# Patient Record
Sex: MALE | Race: WHITE | Employment: OTHER | URBAN - METROPOLITAN AREA
[De-identification: names, ages, dates, MRNs, and addresses within clinical notes are randomized per-mention and may not be internally consistent; named-entity substitution may affect disease eponyms.]

---

## 2019-05-01 LAB
AVERAGE GLUCOSE: 91
AVERAGE GLUCOSE: 91
CHOLESTEROL, TOTAL: 103 MG/DL
CHOLESTEROL/HDL RATIO: 3
HBA1C MFR BLD: 4.8 %
HBA1C MFR BLD: 4.8 %
HDLC SERPL-MCNC: 34 MG/DL (ref 35–70)
LDL CHOLESTEROL CALCULATED: 54 MG/DL (ref 0–160)
NONHDLC SERPL-MCNC: ABNORMAL MG/DL
TESTOSTERONE FREE: 42.5
TESTOSTERONE TOTAL: 253
TRIGL SERPL-MCNC: 75 MG/DL
TSH SERPL DL<=0.05 MIU/L-ACNC: 0.69 UIU/ML
VLDLC SERPL CALC-MCNC: ABNORMAL MG/DL

## 2019-12-06 ENCOUNTER — HOSPITAL ENCOUNTER (OUTPATIENT)
Age: 69
Discharge: HOME | End: 2019-12-08
Payer: MEDICARE

## 2019-12-06 ENCOUNTER — HOSPITAL ENCOUNTER (OUTPATIENT)
Dept: GENERAL RADIOLOGY | Age: 69
Discharge: HOME | End: 2019-12-08
Payer: MEDICARE

## 2019-12-06 ENCOUNTER — HOSPITAL ENCOUNTER (OUTPATIENT)
Dept: NON INVASIVE DIAGNOSTICS | Age: 69
Discharge: HOME | End: 2019-12-06
Payer: MEDICARE

## 2019-12-06 DIAGNOSIS — R06.02 SOB (SHORTNESS OF BREATH): ICD-10-CM

## 2019-12-06 PROCEDURE — 71046 X-RAY EXAM CHEST 2 VIEWS: CPT

## 2019-12-06 PROCEDURE — 93350 STRESS TTE ONLY: CPT

## 2019-12-06 RX ORDER — LANOLIN ALCOHOL/MO/W.PET/CERES
500 CREAM (GRAM) TOPICAL NIGHTLY
COMMUNITY
End: 2020-09-03

## 2019-12-06 RX ORDER — ESCITALOPRAM OXALATE 10 MG/1
10 TABLET ORAL DAILY
COMMUNITY
End: 2021-03-30 | Stop reason: SDUPTHER

## 2019-12-06 RX ORDER — SIMVASTATIN 20 MG
10 TABLET ORAL NIGHTLY
COMMUNITY
End: 2021-04-05 | Stop reason: SDUPTHER

## 2020-03-19 LAB
ALBUMIN SERPL-MCNC: NORMAL G/DL
ALP BLD-CCNC: NORMAL U/L
ALT SERPL-CCNC: NORMAL U/L
ANION GAP SERPL CALCULATED.3IONS-SCNC: NORMAL MMOL/L
AST SERPL-CCNC: NORMAL U/L
BASOPHILS ABSOLUTE: NORMAL
BASOPHILS RELATIVE PERCENT: NORMAL
BILIRUB SERPL-MCNC: NORMAL MG/DL
BUN BLDV-MCNC: NORMAL MG/DL
CALCIUM SERPL-MCNC: NORMAL MG/DL
CHLORIDE BLD-SCNC: NORMAL MMOL/L
CHOLESTEROL, TOTAL: 106 MG/DL
CHOLESTEROL/HDL RATIO: 3.5
CO2: NORMAL
CREAT SERPL-MCNC: NORMAL MG/DL
EOSINOPHILS ABSOLUTE: NORMAL
EOSINOPHILS RELATIVE PERCENT: NORMAL
GFR CALCULATED: NORMAL
GLUCOSE BLD-MCNC: NORMAL MG/DL
HCT VFR BLD CALC: NORMAL %
HDLC SERPL-MCNC: 30 MG/DL (ref 35–70)
HEMOGLOBIN: NORMAL
LDL CHOLESTEROL CALCULATED: 57 MG/DL (ref 0–160)
LYMPHOCYTES ABSOLUTE: NORMAL
LYMPHOCYTES RELATIVE PERCENT: NORMAL
MCH RBC QN AUTO: NORMAL PG
MCHC RBC AUTO-ENTMCNC: NORMAL G/DL
MCV RBC AUTO: NORMAL FL
MONOCYTES ABSOLUTE: NORMAL
MONOCYTES RELATIVE PERCENT: NORMAL
NEUTROPHILS ABSOLUTE: NORMAL
NEUTROPHILS RELATIVE PERCENT: NORMAL
NONHDLC SERPL-MCNC: ABNORMAL MG/DL
PDW BLD-RTO: NORMAL %
PLATELET # BLD: NORMAL 10*3/UL
PMV BLD AUTO: NORMAL FL
POTASSIUM SERPL-SCNC: NORMAL MMOL/L
RBC # BLD: NORMAL 10*6/UL
SODIUM BLD-SCNC: NORMAL MMOL/L
TOTAL PROTEIN: NORMAL
TRIGL SERPL-MCNC: 93 MG/DL
VITAMIN D 25-HYDROXY: 37.9
VITAMIN D2, 25 HYDROXY: NORMAL
VITAMIN D3,25 HYDROXY: NORMAL
VLDLC SERPL CALC-MCNC: 19 MG/DL
WBC # BLD: NORMAL 10*3/UL

## 2020-06-19 LAB
CHOLESTEROL, TOTAL: 111 MG/DL
CHOLESTEROL/HDL RATIO: 3
HDLC SERPL-MCNC: 33 MG/DL (ref 35–70)
LDL CHOLESTEROL CALCULATED: 58 MG/DL (ref 0–160)
NONHDLC SERPL-MCNC: ABNORMAL MG/DL
TRIGL SERPL-MCNC: 99 MG/DL
VLDLC SERPL CALC-MCNC: ABNORMAL MG/DL

## 2020-09-03 VITALS
SYSTOLIC BLOOD PRESSURE: 126 MMHG | HEART RATE: 72 BPM | DIASTOLIC BLOOD PRESSURE: 80 MMHG | TEMPERATURE: 96.6 F | RESPIRATION RATE: 14 BRPM | WEIGHT: 227 LBS

## 2020-09-03 RX ORDER — POLYETHYLENE GLYCOL 3350 17 G/17G
17 POWDER, FOR SOLUTION ORAL DAILY
COMMUNITY
End: 2021-02-17

## 2020-09-03 RX ORDER — DOCUSATE SODIUM 100 MG/1
100 CAPSULE, LIQUID FILLED ORAL 2 TIMES DAILY PRN
COMMUNITY
End: 2021-03-10 | Stop reason: ALTCHOICE

## 2020-09-03 RX ORDER — NIACIN 500 MG
500 TABLET ORAL DAILY
COMMUNITY
End: 2021-02-03 | Stop reason: SDUPTHER

## 2020-09-03 RX ORDER — CHOLECALCIFEROL (VITAMIN D3) 1250 MCG
CAPSULE ORAL
COMMUNITY
End: 2021-02-17

## 2020-09-03 RX ORDER — OMEPRAZOLE 20 MG/1
20 CAPSULE, DELAYED RELEASE ORAL DAILY
COMMUNITY
End: 2021-03-10 | Stop reason: ALTCHOICE

## 2020-09-21 VITALS
RESPIRATION RATE: 17 BRPM | TEMPERATURE: 96 F | DIASTOLIC BLOOD PRESSURE: 80 MMHG | HEART RATE: 68 BPM | SYSTOLIC BLOOD PRESSURE: 118 MMHG | WEIGHT: 228 LBS

## 2020-12-23 ENCOUNTER — OFFICE VISIT (OUTPATIENT)
Dept: FAMILY MEDICINE CLINIC | Age: 70
End: 2020-12-23
Payer: MEDICARE

## 2020-12-23 VITALS
HEART RATE: 105 BPM | RESPIRATION RATE: 16 BRPM | DIASTOLIC BLOOD PRESSURE: 88 MMHG | BODY MASS INDEX: 35.55 KG/M2 | TEMPERATURE: 96.8 F | SYSTOLIC BLOOD PRESSURE: 132 MMHG | WEIGHT: 234.6 LBS | OXYGEN SATURATION: 97 % | HEIGHT: 68 IN

## 2020-12-23 LAB
BILIRUBIN, POC: NORMAL
BLOOD URINE, POC: NORMAL
CLARITY, POC: NORMAL
COLOR, POC: NORMAL
GLUCOSE URINE, POC: NORMAL
KETONES, POC: NORMAL
LEUKOCYTE EST, POC: NORMAL
NITRITE, POC: NORMAL
PH, POC: 5.5
PROTEIN, POC: NORMAL
SPECIFIC GRAVITY, POC: 1.02
UROBILINOGEN, POC: NORMAL

## 2020-12-23 PROCEDURE — 93000 ELECTROCARDIOGRAM COMPLETE: CPT | Performed by: INTERNAL MEDICINE

## 2020-12-23 PROCEDURE — 81002 URINALYSIS NONAUTO W/O SCOPE: CPT | Performed by: INTERNAL MEDICINE

## 2020-12-23 PROCEDURE — G0438 PPPS, INITIAL VISIT: HCPCS | Performed by: INTERNAL MEDICINE

## 2020-12-23 PROCEDURE — G8484 FLU IMMUNIZE NO ADMIN: HCPCS | Performed by: INTERNAL MEDICINE

## 2020-12-23 RX ORDER — ASPIRIN 81 MG/1
81 TABLET ORAL DAILY
COMMUNITY

## 2020-12-23 ASSESSMENT — PATIENT HEALTH QUESTIONNAIRE - PHQ9
SUM OF ALL RESPONSES TO PHQ QUESTIONS 1-9: 0
SUM OF ALL RESPONSES TO PHQ QUESTIONS 1-9: 0
2. FEELING DOWN, DEPRESSED OR HOPELESS: 0
SUM OF ALL RESPONSES TO PHQ9 QUESTIONS 1 & 2: 0
1. LITTLE INTEREST OR PLEASURE IN DOING THINGS: 0
SUM OF ALL RESPONSES TO PHQ QUESTIONS 1-9: 0

## 2020-12-23 NOTE — PROGRESS NOTES
Subjective:     Chief Complaint   Patient presents with    Annual Exam   Patient in for follow-up on the lab report, annual exam, check on the cholesterol  GE reflux is doing much better    Past Medical History:   Diagnosis Date    Depression     Erectile dysfunction     GERD (gastroesophageal reflux disease)     Glaucoma     Headache     Hemorrhoids     History of cardiovascular stress test 2019    Stress ECHO    Hyperlipidemia     Hypogonadism male     Seizures ([facility] [address]     Sleep apnea         Social History     Socioeconomic History    Marital status:      Spouse name: Not on file    Number of children: Not on file    Years of education: Not on file    Highest education level: Not on file   Occupational History    Not on file   Social Needs    Financial resource strain: Not on file    Food insecurity     Worry: Not on file     Inability: Not on file    Transportation needs     Medical: Not on file     Non-medical: Not on file   Tobacco Use    Smoking status: Former Smoker     Packs/day: 1.00     Years: 18.00     Pack years: 18.00     Types: Cigarettes     Quit date: 9/3/1986     Years since quittin.3    Smokeless tobacco: Former User   Substance and Sexual Activity    Alcohol use: Yes     Comment: socially    Drug use: Not on file    Sexual activity: Yes     Partners: Female   Lifestyle    Physical activity     Days per week: Not on file     Minutes per session: Not on file    Stress: Not on file   Relationships    Social connections     Talks on phone: Not on file     Gets together: Not on file     Attends Orthodox service: Not on file     Active member of club or organization: Not on file     Attends meetings of clubs or organizations: Not on file     Relationship status: Not on file    Intimate partner violence     Fear of current or ex partner: Not on file     Emotionally abused: Not on file     Physically abused: Not on file     Forced sexual activity: Not on file 20 MG delayed release capsule Take 20 mg by mouth daily      polyethylene glycol (GLYCOLAX) 17 GM/SCOOP powder Take 17 g by mouth daily      docusate sodium (COLACE) 100 MG capsule Take 100 mg by mouth 2 times daily as needed for Constipation       No current facility-administered medications for this visit.          Last 3 BMP  Lab Results   Component Value Date/Time     12/18/2020 08:30 AM    K 4.7 12/18/2020 08:30 AM     12/18/2020 08:30 AM    CO2 22 12/18/2020 08:30 AM    BUN 15 12/18/2020 08:30 AM    CREATININE 1.0 12/18/2020 08:30 AM    GLUCOSE 105 (H) 12/18/2020 08:30 AM    CALCIUM 9.8 12/18/2020 08:30 AM       Last 3 CMP:    Lab Results   Component Value Date/Time     12/18/2020 08:30 AM    K 4.7 12/18/2020 08:30 AM     12/18/2020 08:30 AM    CO2 22 12/18/2020 08:30 AM    BUN 15 12/18/2020 08:30 AM    CREATININE 1.0 12/18/2020 08:30 AM    GLUCOSE 105 (H) 12/18/2020 08:30 AM    CALCIUM 9.8 12/18/2020 08:30 AM    PROT 7.7 12/18/2020 08:30 AM    LABALBU 4.4 12/18/2020 08:30 AM    BILITOT 0.5 12/18/2020 08:30 AM    ALKPHOS 79 12/18/2020 08:30 AM    AST 34 12/18/2020 08:30 AM    ALT 28 12/18/2020 08:30 AM        CBC:   Lab Results   Component Value Date/Time    WBC 6.8 12/18/2020 08:30 AM    RBC 5.01 12/18/2020 08:30 AM    HGB 14.7 12/18/2020 08:30 AM    HCT 44.0 12/18/2020 08:30 AM    MCV 87.8 12/18/2020 08:30 AM    MCH 29.3 12/18/2020 08:30 AM    MCHC 33.4 12/18/2020 08:30 AM    RDW 13.1 12/18/2020 08:30 AM     12/18/2020 08:30 AM    MPV 9.6 12/18/2020 08:30 AM       A1C:  Lab Results   Component Value Date/Time    LABA1C 4.8 05/01/2019    LABA1C 4.8 05/01/2019       Lipid panel:  Lab Results   Component Value Date    CHOL 123 12/18/2020    CHOL 111 06/19/2020    CHOL 106 03/19/2020    TRIG 105 12/18/2020    TRIG 99 06/19/2020    TRIG 93 03/19/2020    HDL 33 12/18/2020    HDL 33 06/19/2020    HDL 30 03/19/2020        Lab Results   Component Value Date/Time    PROT 7.7 12/18/2020 08:30 AM       No results found for: MG      Assessment. Nirav Cunningham was seen today for annual exam.    Diagnoses and all orders for this visit:    Annual physical exam  -     POCT Urinalysis no Micro  -     EKG 12 Lead; Future  -     EKG 12 Lead    Hyperlipidemia, unspecified hyperlipidemia type    Depression, unspecified depression type    Gastroesophageal reflux disease without esophagitis    Obstructive sleep apnea syndrome    Other orders  -     Cancel: INFLUENZA, QUADV, ADJUVANTED, 65 YRS =, IM, PF, PREFILL SYR, 0.5ML (FLUAD)       There is no problem list on file for this patient. Plan: Lab report reviewed  Cholesterol doing well  Blood sugar slightly elevated he is eating a lot of carbohydrates and has gained weight    Low-carb diet discussed regular exercise discussed    She had a stress test in 2019 with Dr. Ruth Villa which was okay as per patient, chest x-ray December 2019 -    He had a colonoscopy December 2019 by Dr. Jennifer Orellana which was negative    Continue current cluster medication Lexapro      Patient was counseled        Return in about 3 months (around 3/23/2021).        Myriam Mejia MD  4:05 PM  12/23/2020     [place]

## 2021-01-01 ENCOUNTER — HOSPITAL ENCOUNTER (EMERGENCY)
Age: 71
Discharge: HOME | End: 2021-01-01
Payer: MEDICARE

## 2021-01-01 ENCOUNTER — APPOINTMENT (OUTPATIENT)
Dept: GENERAL RADIOLOGY | Age: 71
End: 2021-01-01
Payer: MEDICARE

## 2021-01-01 VITALS
HEART RATE: 69 BPM | RESPIRATION RATE: 20 BRPM | SYSTOLIC BLOOD PRESSURE: 139 MMHG | WEIGHT: 234 LBS | TEMPERATURE: 97.8 F | DIASTOLIC BLOOD PRESSURE: 87 MMHG | OXYGEN SATURATION: 97 % | HEIGHT: 68 IN | BODY MASS INDEX: 35.46 KG/M2

## 2021-01-01 DIAGNOSIS — M79.645 THUMB PAIN, LEFT: Primary | ICD-10-CM

## 2021-01-01 PROCEDURE — 99212 OFFICE O/P EST SF 10 MIN: CPT

## 2021-01-01 PROCEDURE — 73130 X-RAY EXAM OF HAND: CPT

## 2021-01-01 PROCEDURE — 6360000002 HC RX W HCPCS: Performed by: PHYSICIAN ASSISTANT

## 2021-01-01 PROCEDURE — 96372 THER/PROPH/DIAG INJ SC/IM: CPT

## 2021-01-01 RX ORDER — HYDROCODONE BITARTRATE AND ACETAMINOPHEN 5; 325 MG/1; MG/1
1 TABLET ORAL EVERY 6 HOURS PRN
Qty: 12 TABLET | Refills: 0 | Status: SHIPPED | OUTPATIENT
Start: 2021-01-01 | End: 2021-01-04

## 2021-01-01 RX ORDER — DEXAMETHASONE SODIUM PHOSPHATE 10 MG/ML
10 INJECTION, SOLUTION INTRAMUSCULAR; INTRAVENOUS ONCE
Status: COMPLETED | OUTPATIENT
Start: 2021-01-01 | End: 2021-01-01

## 2021-01-01 RX ADMIN — DEXAMETHASONE SODIUM PHOSPHATE 10 MG: 10 INJECTION, SOLUTION INTRAMUSCULAR; INTRAVENOUS at 12:01

## 2021-01-01 ASSESSMENT — PAIN DESCRIPTION - ORIENTATION: ORIENTATION: LEFT

## 2021-01-01 ASSESSMENT — PAIN SCALES - GENERAL: PAINLEVEL_OUTOF10: 9

## 2021-01-01 ASSESSMENT — PAIN DESCRIPTION - LOCATION: LOCATION: FINGER (COMMENT WHICH ONE)

## 2021-01-01 NOTE — ED PROVIDER NOTES
This is a 80-year-old male the presents to urgent care complaining of pain to the left hand more specifically at the base of the left thumb for the past day. He denies any injury but states the pain and swelling remind him of a problem he had with the right hand years ago and he had to have surgery to correct it. States he had some severe arthritic condition. He denies any history of gout. No numbness or tingling he has not take anything for the pain yet. On first contact patient he appears to be in no acute distress. Review of Systems   Constitutional:        Pertinent positives and negatives are stated within HPI, all other systems reviewed and are negative. Physical Exam  Vitals signs and nursing note reviewed. Constitutional:       Appearance: He is well-developed. HENT:      Head: Normocephalic and atraumatic. Jaw: No trismus. Right Ear: Hearing, tympanic membrane, ear canal and external ear normal.      Left Ear: Hearing, tympanic membrane, ear canal and external ear normal.      Nose: Nose normal.      Right Sinus: No maxillary sinus tenderness or frontal sinus tenderness. Left Sinus: No maxillary sinus tenderness or frontal sinus tenderness. Mouth/Throat:      Pharynx: Uvula midline. No uvula swelling. Eyes:      General: Lids are normal.      Conjunctiva/sclera: Conjunctivae normal.      Pupils: Pupils are equal, round, and reactive to light. Neck:      Musculoskeletal: Normal range of motion and neck supple. Cardiovascular:      Rate and Rhythm: Normal rate and regular rhythm. Heart sounds: Normal heart sounds. No murmur. Pulmonary:      Effort: Pulmonary effort is normal.      Breath sounds: Normal breath sounds. Abdominal:      General: Bowel sounds are normal.      Palpations: Abdomen is soft. Abdomen is not rigid. Tenderness: There is no abdominal tenderness. There is no guarding or rebound.    Musculoskeletal:      Left shoulder: Normal. Left elbow: Normal.      Left wrist: Normal.      Comments: He has tenderness to the left hand from the left first MTP joint area down the thenar eminence with some moderate swelling. I do not appreciate any erythema there is no open area no cyanosis. No red streaking. He deftly has pain with movement of thumb. Capillary risk is brisk. Skin:     General: Skin is warm and dry. Findings: No abrasion or rash. Neurological:      Mental Status: He is alert and oriented to person, place, and time. GCS: GCS eye subscore is 4. GCS verbal subscore is 5. GCS motor subscore is 6. Cranial Nerves: No cranial nerve deficit. Sensory: No sensory deficit. Coordination: Coordination normal.      Gait: Gait normal.         Procedures    MDM  Number of Diagnoses or Management Options  Thumb pain, left  Diagnosis management comments: X-ray was reviewed. Will place patient in a thumb spica type splint. Have him follow-up with his orthopedic surgeon I will give him a dose of Decadron here and place him on some pain medication for home. May also use topical medications as well and use an anti-inflammatory medication instructions given.           --------------------------------------------- PAST HISTORY ---------------------------------------------  Past Medical History:  has a past medical history of Depression, Erectile dysfunction, GERD (gastroesophageal reflux disease), Glaucoma, Headache, Hemorrhoids, History of cardiovascular stress test, Hyperlipidemia, Hypogonadism male, Seizures ([facility] [address] and Sleep apnea. Past Surgical History:  has a past surgical history that includes Knee Arthroplasty (Left, 2013); Cardiac catheterization (2006); and joint replacement (Left, 12/2015). Social History:  reports that he quit smoking about 34 years ago. His smoking use included cigarettes. He has a 18.00 pack-year smoking history.  He has quit using smokeless tobacco. He reports current alcohol use.    Family History: family history is not on file. The patients home medications have been reviewed. Allergies: Patient has no known allergies. -------------------------------------------------- RESULTS -------------------------------------------------  No results found for this visit on 01/01/21. XR HAND LEFT (MIN 3 VIEWS)   Final Result   No definite radiographically visible acute skeletal pathology   Multifocal degenerative change most marked at the thumb [facility]. Degenerative and/or postoperative remodeling at the base of the thumb   metacarpal   Nonspecific focal osteophytes at the index and middle finger metacarpal   heads. Considerations include hemochromatosis, CPPD, and primary   osteoarthritis. More rare possibilities include pseudogout, metabolic   storage disorders, hyperparathyroidism, psoriatic arthritis, and rheumatoid   arthritis.          ------------------------- NURSING NOTES AND VITALS REVIEWED ---------------------------   The nursing notes within the ED encounter and vital signs as below have been reviewed. /87   Pulse 69   Temp 97.8 °F (36.6 °C) (Infrared)   Resp 20   Ht 5' 8\" (1.727 m)   Wt 234 lb (106.1 kg)   SpO2 97%   BMI 35.58 kg/m²   Oxygen Saturation Interpretation: Normal      ------------------------------------------ PROGRESS NOTES ------------------------------------------   I have spoken with the patient and discussed todays results, in addition to providing specific details for the plan of care and counseling regarding the diagnosis and prognosis. Their questions are answered at this time and they are agreeable with the plan.      --------------------------------- ADDITIONAL PROVIDER NOTES ---------------------------------     This patient is stable for discharge. I have shared the specific conditions for return, as well as the importance of follow-up. * NOTE: This report was transcribed using voice recognition software.  Every effort was made to ensure accuracy; however, inadvertent computerized transcription errors may be present.    --------------------------------- IMPRESSION AND DISPOSITION ---------------------------------    IMPRESSION  1.  Thumb pain, left        DISPOSITION  Disposition: Discharge to home  Patient condition is good         Shree Sweeney PA-C  01/01/21 1520

## 2021-02-03 RX ORDER — NIACIN 500 MG
500 TABLET ORAL DAILY
Qty: 30 TABLET | Refills: 3 | Status: SHIPPED | OUTPATIENT
Start: 2021-02-03

## 2021-02-03 NOTE — TELEPHONE ENCOUNTER
Patient is requesting a refill of:    niacin 500 MG tablet  500 mg, DAILY         Last seen Visit date not found  Next appt 3-      [facility] [address]

## 2021-02-17 ENCOUNTER — OFFICE VISIT (OUTPATIENT)
Dept: FAMILY MEDICINE CLINIC | Age: 71
End: 2021-02-17
Payer: MEDICARE

## 2021-02-17 ENCOUNTER — TELEPHONE (OUTPATIENT)
Dept: ADMINISTRATIVE | Age: 71
End: 2021-02-17

## 2021-02-17 VITALS
HEIGHT: 70 IN | OXYGEN SATURATION: 98 % | DIASTOLIC BLOOD PRESSURE: 80 MMHG | HEART RATE: 78 BPM | SYSTOLIC BLOOD PRESSURE: 136 MMHG | TEMPERATURE: 98 F | BODY MASS INDEX: 34.07 KG/M2 | WEIGHT: 238 LBS

## 2021-02-17 DIAGNOSIS — N63.20 LEFT BREAST LUMP: Primary | ICD-10-CM

## 2021-02-17 DIAGNOSIS — N39.0 URINARY TRACT INFECTION WITHOUT HEMATURIA, SITE UNSPECIFIED: ICD-10-CM

## 2021-02-17 DIAGNOSIS — E78.5 HYPERLIPIDEMIA, UNSPECIFIED HYPERLIPIDEMIA TYPE: ICD-10-CM

## 2021-02-17 DIAGNOSIS — N64.4 PAIN OF LEFT BREAST: ICD-10-CM

## 2021-02-17 DIAGNOSIS — N64.4 BREAST PAIN, LEFT: ICD-10-CM

## 2021-02-17 DIAGNOSIS — F32.A DEPRESSION, UNSPECIFIED DEPRESSION TYPE: ICD-10-CM

## 2021-02-17 DIAGNOSIS — N63.20 LUMP OF LEFT BREAST: Primary | ICD-10-CM

## 2021-02-17 LAB
ALBUMIN SERPL-MCNC: 4.4 G/DL (ref 3.5–5.2)
ALP BLD-CCNC: 96 U/L (ref 40–129)
ALT SERPL-CCNC: 31 U/L (ref 0–40)
ANION GAP SERPL CALCULATED.3IONS-SCNC: 10 MMOL/L (ref 7–16)
AST SERPL-CCNC: 45 U/L (ref 0–39)
BILIRUB SERPL-MCNC: 0.2 MG/DL (ref 0–1.2)
BILIRUBIN URINE: NEGATIVE
BLOOD, URINE: NEGATIVE
BUN BLDV-MCNC: 17 MG/DL (ref 8–23)
CALCIUM SERPL-MCNC: 9.7 MG/DL (ref 8.6–10.2)
CHLORIDE BLD-SCNC: 103 MMOL/L (ref 98–107)
CHOLESTEROL, TOTAL: 118 MG/DL (ref 0–199)
CLARITY: CLEAR
CO2: 26 MMOL/L (ref 22–29)
COLOR: YELLOW
CREAT SERPL-MCNC: 1 MG/DL (ref 0.7–1.2)
GFR AFRICAN AMERICAN: >60
GFR NON-AFRICAN AMERICAN: >60 ML/MIN/1.73
GLUCOSE BLD-MCNC: 138 MG/DL (ref 74–99)
GLUCOSE URINE: NEGATIVE MG/DL
HDLC SERPL-MCNC: 30 MG/DL
KETONES, URINE: NEGATIVE MG/DL
LDL CHOLESTEROL CALCULATED: 56 MG/DL (ref 0–99)
LEUKOCYTE ESTERASE, URINE: NEGATIVE
NITRITE, URINE: NEGATIVE
PH UA: 5.5 (ref 5–9)
POTASSIUM SERPL-SCNC: 4.5 MMOL/L (ref 3.5–5)
PROTEIN UA: NEGATIVE MG/DL
SODIUM BLD-SCNC: 139 MMOL/L (ref 132–146)
SPECIFIC GRAVITY UA: 1.02 (ref 1–1.03)
TOTAL PROTEIN: 7.9 G/DL (ref 6.4–8.3)
TRIGL SERPL-MCNC: 159 MG/DL (ref 0–149)
UROBILINOGEN, URINE: 0.2 E.U./DL
VLDLC SERPL CALC-MCNC: 32 MG/DL

## 2021-02-17 PROCEDURE — G8427 DOCREV CUR MEDS BY ELIG CLIN: HCPCS | Performed by: INTERNAL MEDICINE

## 2021-02-17 PROCEDURE — 1123F ACP DISCUSS/DSCN MKR DOCD: CPT | Performed by: INTERNAL MEDICINE

## 2021-02-17 PROCEDURE — 1036F TOBACCO NON-USER: CPT | Performed by: INTERNAL MEDICINE

## 2021-02-17 PROCEDURE — 99213 OFFICE O/P EST LOW 20 MIN: CPT | Performed by: INTERNAL MEDICINE

## 2021-02-17 PROCEDURE — 3017F COLORECTAL CA SCREEN DOC REV: CPT | Performed by: INTERNAL MEDICINE

## 2021-02-17 PROCEDURE — G8417 CALC BMI ABV UP PARAM F/U: HCPCS | Performed by: INTERNAL MEDICINE

## 2021-02-17 PROCEDURE — G8484 FLU IMMUNIZE NO ADMIN: HCPCS | Performed by: INTERNAL MEDICINE

## 2021-02-17 PROCEDURE — 4040F PNEUMOC VAC/ADMIN/RCVD: CPT | Performed by: INTERNAL MEDICINE

## 2021-02-17 SDOH — ECONOMIC STABILITY: FOOD INSECURITY: WITHIN THE PAST 12 MONTHS, YOU WORRIED THAT YOUR FOOD WOULD RUN OUT BEFORE YOU GOT MONEY TO BUY MORE.: NEVER TRUE

## 2021-02-17 SDOH — ECONOMIC STABILITY: TRANSPORTATION INSECURITY
IN THE PAST 12 MONTHS, HAS THE LACK OF TRANSPORTATION KEPT YOU FROM MEDICAL APPOINTMENTS OR FROM GETTING MEDICATIONS?: NO

## 2021-02-17 SDOH — ECONOMIC STABILITY: TRANSPORTATION INSECURITY
IN THE PAST 12 MONTHS, HAS LACK OF TRANSPORTATION KEPT YOU FROM MEETINGS, WORK, OR FROM GETTING THINGS NEEDED FOR DAILY LIVING?: NO

## 2021-02-17 SDOH — ECONOMIC STABILITY: FOOD INSECURITY: WITHIN THE PAST 12 MONTHS, THE FOOD YOU BOUGHT JUST DIDN'T LAST AND YOU DIDN'T HAVE MONEY TO GET MORE.: NEVER TRUE

## 2021-02-17 SDOH — ECONOMIC STABILITY: INCOME INSECURITY: HOW HARD IS IT FOR YOU TO PAY FOR THE VERY BASICS LIKE FOOD, HOUSING, MEDICAL CARE, AND HEATING?: NOT ASKED

## 2021-02-17 NOTE — PROGRESS NOTES
Subjective:     Chief Complaint   Patient presents with    Breast Pain     LEFT   Complains of discomfort left breast for the past 2 months, when he holds it or squeezing the left breast it is difficult discomfort  He noticed a lumpy feeling of the left breast 2 months ago  Denies any nipple discharge  Sister had a breast cancer  He not using any Aldactone    He was seen by Dr. Kirt Graham 2019 he says he had a stress test    Denies any angina    Tolerating depression and cluster medication    He has some difficulty urination    Past Medical History:   Diagnosis Date    Depression     Erectile dysfunction     GERD (gastroesophageal reflux disease)     Glaucoma     Headache     Hemorrhoids     History of cardiovascular stress test 2019    Stress ECHO    Hyperlipidemia     Hypogonadism male     Seizures ([facility] [address]     Sleep apnea         Social History     Socioeconomic History    Marital status:      Spouse name: Not on file    Number of children: Not on file    Years of education: Not on file    Highest education level: Not on file   Occupational History    Not on file   Social Needs    Financial resource strain: Not on file    Food insecurity     Worry: Never true     Inability: Never true    Transportation needs     Medical: No     Non-medical: No   Tobacco Use    Smoking status: Former Smoker     Packs/day: 1.00     Years: 18.00     Pack years: 18.00     Types: Cigarettes     Quit date: 9/3/1986     Years since quittin.4    Smokeless tobacco: Former User   Substance and Sexual Activity    Alcohol use: Yes     Comment: socially    Drug use: Not on file    Sexual activity: Yes     Partners: Female   Lifestyle    Physical activity     Days per week: Not on file     Minutes per session: Not on file    Stress: Not on file   Relationships    Social connections     Talks on phone: Not on file     Gets together: Not on file     Attends Jain service: Not on file     Active member of club or organization: Not on file     Attends meetings of clubs or organizations: Not on file     Relationship status: Not on file    Intimate partner violence     Fear of current or ex partner: Not on file     Emotionally abused: Not on file     Physically abused: Not on file     Forced sexual activity: Not on file   Other Topics Concern    Not on file   Social History Narrative    Not on file        Past Surgical History:   Procedure Laterality Date    CARDIAC CATHETERIZATION  2006    JOINT REPLACEMENT Left 12/2015    knee    KNEE ARTHROPLASTY Left 2013        No family history on file. No Known Allergies     ROS  No acute distress  Cardiac: Denies any chest pain or palpitation no chest pain no angina with exertion  Respiratory: Denies any cough or shortness of breath denies any chronic cough  GI: No abdominal pain. Denies any nausea vomiting or diarrhea  : Denies any dysuria frequency or hematuria  Neuro: No headache or dizziness  Endocrine: No diabetes  Skin: normal  No recent weight gain or weight loss  Denies any change in vision  Chest lump left breast 2 months discomfort left breast 2 months  Objective:    /80   Pulse 78   Temp 98 °F (36.7 °C)   Ht 5' 10\" (1.778 m)   Wt 238 lb (108 kg)   SpO2 98%   BMI 34.15 kg/m²     Constitutional: Alert awake and oriented  Eyes: Pupils equal bilaterally. Extraocular muscles intact  Neck: no JVD adenopathy no bruit  Heart:  RRR, no murmurs, gallops, or rubs.   Lungs:    no wheeze, rales or rhonchi  Abd: bowel sounds present, nontender, nondistended, no masses  Extrem:  No clubbing, cyanosis, or edema  Neuro: AAOx3,No Focal deficit  Psychological: no depression or anxiety   Chest exam left breast is more prominent than the right it feels more firm compared to the right no localized lump  No adenopathy in the left axilla  No discharge from the nipple    Current Outpatient Medications   Medication Sig Dispense Refill    niacin 500 MG tablet Take 1 tablet by mouth daily 30 tablet 3    aspirin 81 MG EC tablet Take 81 mg by mouth daily      escitalopram (LEXAPRO) 10 MG tablet Take 10 mg by mouth daily      simvastatin (ZOCOR) 20 MG tablet Take 10 mg by mouth nightly       omeprazole (PRILOSEC) 20 MG delayed release capsule Take 20 mg by mouth daily      docusate sodium (COLACE) 100 MG capsule Take 100 mg by mouth 2 times daily as needed for Constipation       No current facility-administered medications for this visit.          Last 3 BMP  Lab Results   Component Value Date/Time     12/18/2020 08:30 AM    K 4.7 12/18/2020 08:30 AM     12/18/2020 08:30 AM    CO2 22 12/18/2020 08:30 AM    BUN 15 12/18/2020 08:30 AM    CREATININE 1.0 12/18/2020 08:30 AM    GLUCOSE 105 (H) 12/18/2020 08:30 AM    CALCIUM 9.8 12/18/2020 08:30 AM       Last 3 CMP:    Lab Results   Component Value Date/Time     12/18/2020 08:30 AM    K 4.7 12/18/2020 08:30 AM     12/18/2020 08:30 AM    CO2 22 12/18/2020 08:30 AM    BUN 15 12/18/2020 08:30 AM    CREATININE 1.0 12/18/2020 08:30 AM    GLUCOSE 105 (H) 12/18/2020 08:30 AM    CALCIUM 9.8 12/18/2020 08:30 AM    PROT 7.7 12/18/2020 08:30 AM    LABALBU 4.4 12/18/2020 08:30 AM    BILITOT 0.5 12/18/2020 08:30 AM    ALKPHOS 79 12/18/2020 08:30 AM    AST 34 12/18/2020 08:30 AM    ALT 28 12/18/2020 08:30 AM        CBC:   Lab Results   Component Value Date/Time    WBC 6.8 12/18/2020 08:30 AM    RBC 5.01 12/18/2020 08:30 AM    HGB 14.7 12/18/2020 08:30 AM    HCT 44.0 12/18/2020 08:30 AM    MCV 87.8 12/18/2020 08:30 AM    MCH 29.3 12/18/2020 08:30 AM    MCHC 33.4 12/18/2020 08:30 AM    RDW 13.1 12/18/2020 08:30 AM     12/18/2020 08:30 AM    MPV 9.6 12/18/2020 08:30 AM       A1C:  Lab Results   Component Value Date/Time    LABA1C 4.8 05/01/2019    LABA1C 4.8 05/01/2019       Lipid panel:  Lab Results   Component Value Date    CHOL 123 12/18/2020    CHOL 111 06/19/2020    CHOL 106 03/19/2020    TRIG 105 12/18/2020 TRIG 99 06/19/2020    TRIG 93 03/19/2020    HDL 33 12/18/2020    HDL 33 06/19/2020    HDL 30 03/19/2020        Lab Results   Component Value Date/Time    PROT 7.7 12/18/2020 08:30 AM       No results found for: MG      Assessment. Prashant Conteh was seen today for breast pain. Diagnoses and all orders for this visit:    Lump of left breast  -     GILES DIGITAL SCREEN LEFT PER PROTOCOL; Future  -     US BREAST COMPLETE LEFT; Future  -     External Referral To General Surgery    Pain of left breast  -     GILES DIGITAL SCREEN LEFT PER PROTOCOL; Future  -     US BREAST COMPLETE LEFT; Future  -     External Referral To General Surgery    Urinary tract infection without hematuria, site unspecified  -     URINALYSIS; Future  -     Culture, Urine; Future    Hyperlipidemia, unspecified hyperlipidemia type  -     COMPREHENSIVE METABOLIC PANEL; Future  -     LIPID PANEL; Future    Depression, unspecified depression type       There is no problem list on file for this patient. Plan: Mammogram left breast  Ultrasound left breast    Refer to Dr. Martín Dodge for opinion    Check CMP lipid profile    Check urine CNS for possible UTI    Drink plenty of fluids    Patient counseled    Follow-up in 4 to 6 weeks    No follow-ups on file.        Stiven Albarado MD  1:51 PM  2/17/2021     [place]

## 2021-02-17 NOTE — TELEPHONE ENCOUNTER
Pt states he's having breast pain x 2 wks and needing seen. Wants wife seen, Janett Sheffield, [id number], at same time for possible UTI. Please contact at [phone] and advise. Thank you in advance.

## 2021-02-18 ENCOUNTER — HOSPITAL ENCOUNTER (OUTPATIENT)
Dept: GENERAL RADIOLOGY | Age: 71
Discharge: HOME | End: 2021-02-20
Payer: MEDICARE

## 2021-02-18 ENCOUNTER — TELEPHONE (OUTPATIENT)
Dept: FAMILY MEDICINE CLINIC | Age: 71
End: 2021-02-18

## 2021-02-18 DIAGNOSIS — N64.4 BREAST PAIN, LEFT: ICD-10-CM

## 2021-02-18 DIAGNOSIS — N63.20 LEFT BREAST LUMP: ICD-10-CM

## 2021-02-18 PROCEDURE — 76642 ULTRASOUND BREAST LIMITED: CPT

## 2021-02-18 PROCEDURE — G0279 TOMOSYNTHESIS, MAMMO: HCPCS

## 2021-02-18 NOTE — TELEPHONE ENCOUNTER
Pt called he had mammogram and US he was told it nothing he has appt with surgeon should he go?  please call     [phone]

## 2021-02-19 LAB — URINE CULTURE, ROUTINE: NORMAL

## 2021-02-26 ENCOUNTER — TELEPHONE (OUTPATIENT)
Dept: FAMILY MEDICINE CLINIC | Age: 71
End: 2021-02-26

## 2021-03-10 ENCOUNTER — TELEPHONE (OUTPATIENT)
Dept: FAMILY MEDICINE CLINIC | Age: 71
End: 2021-03-10

## 2021-03-10 ENCOUNTER — HOSPITAL ENCOUNTER (EMERGENCY)
Age: 71
Discharge: HOME | End: 2021-03-10
Payer: MEDICARE

## 2021-03-10 ENCOUNTER — TELEPHONE (OUTPATIENT)
Dept: ADMINISTRATIVE | Age: 71
End: 2021-03-10

## 2021-03-10 VITALS
BODY MASS INDEX: 35.01 KG/M2 | OXYGEN SATURATION: 96 % | DIASTOLIC BLOOD PRESSURE: 79 MMHG | HEART RATE: 79 BPM | SYSTOLIC BLOOD PRESSURE: 142 MMHG | TEMPERATURE: 97.6 F | HEIGHT: 68 IN | WEIGHT: 231 LBS | RESPIRATION RATE: 18 BRPM

## 2021-03-10 DIAGNOSIS — B02.9 HERPES ZOSTER WITHOUT COMPLICATION: Primary | ICD-10-CM

## 2021-03-10 PROCEDURE — 99211 OFF/OP EST MAY X REQ PHY/QHP: CPT

## 2021-03-10 RX ORDER — HYDROCODONE BITARTRATE AND ACETAMINOPHEN 5; 325 MG/1; MG/1
1 TABLET ORAL EVERY 6 HOURS PRN
Qty: 12 TABLET | Refills: 0 | Status: SHIPPED | OUTPATIENT
Start: 2021-03-10 | End: 2021-03-13

## 2021-03-10 RX ORDER — VALACYCLOVIR HYDROCHLORIDE 1 G/1
1000 TABLET, FILM COATED ORAL 3 TIMES DAILY
Qty: 30 TABLET | Refills: 0 | Status: SHIPPED | OUTPATIENT
Start: 2021-03-10 | End: 2021-03-20

## 2021-03-10 RX ORDER — PREDNISONE 10 MG/1
40 TABLET ORAL DAILY
Qty: 20 TABLET | Refills: 0 | Status: SHIPPED | OUTPATIENT
Start: 2021-03-10 | End: 2021-03-15

## 2021-03-10 ASSESSMENT — PAIN DESCRIPTION - ONSET: ONSET: ON-GOING

## 2021-03-10 ASSESSMENT — PAIN DESCRIPTION - LOCATION
LOCATION: BACK
LOCATION: BACK

## 2021-03-10 ASSESSMENT — PAIN SCALES - GENERAL
PAINLEVEL_OUTOF10: 8
PAINLEVEL_OUTOF10: 7

## 2021-03-10 ASSESSMENT — PAIN DESCRIPTION - FREQUENCY: FREQUENCY: INTERMITTENT

## 2021-03-10 ASSESSMENT — PAIN DESCRIPTION - PAIN TYPE: TYPE: ACUTE PAIN

## 2021-03-10 ASSESSMENT — PAIN - FUNCTIONAL ASSESSMENT
PAIN_FUNCTIONAL_ASSESSMENT: 0-10
PAIN_FUNCTIONAL_ASSESSMENT: ACTIVITIES ARE NOT PREVENTED

## 2021-03-10 NOTE — ED PROVIDER NOTES
[id number]  Department of Emergency Medicine   ED  Encounter Note  Admit Date/RoomTime: 3/10/2021  3:06 PM  ED Room:   NAME: Amaryllis Hammans  : 1950  MRN: [id number]     Chief Complaint:  Back Pain (Pt states \"I don't know What I did \"  Pt \"Denies any burning or pain with urinating\"  Pt states \"This happens every 5 to 6 yrs\" )    HISTORY OF PRESENT ILLNESS        Amaryllis Hammans is a 79 y.o. male who presents to the ED with a complaint of right-sided low back pain that is radiating down his right buttock to behind his right thigh. Patient states for 4 days now he has been having a burning pain to his right low back. Pain does radiate into his right buttock to just behind his right thigh. He also admits that the area to his right low back is extremely itchy. At home he has been taking an old Norco prescription that does help for a little bit. He denies any injury. Denies any heavy lifting. No numbness or tingling down to his toes. No loss of function. No dysuria. Patient denies any urine incontinence or retention. Denies saddle anesthesia. Denies any bowel incontinence or retention. Patient is not a diabetic. He is not on any blood thinners. ROS   Pertinent positives and negatives are stated within HPI, all other systems reviewed and are negative. Past Medical History:  has a past medical history of Depression, Erectile dysfunction, GERD (gastroesophageal reflux disease), Glaucoma, Headache, Hemorrhoids, History of cardiovascular stress test, Hyperlipidemia, Hypogonadism male, Seizures ([facility] [address] and Sleep apnea. Surgical History:  has a past surgical history that includes Knee Arthroplasty (Left, ); Cardiac catheterization (); and joint replacement (Left, 2015). Social History:  reports that he quit smoking about 34 years ago. His smoking use included cigarettes. He has a 18.00 pack-year smoking history.  He has quit using smokeless tobacco. He reports current alcohol use. He reports that he does not use drugs. Family History: family history is not on file. Allergies: Patient has no known allergies. PHYSICAL EXAM   Oxygen Saturation Interpretation: Normal.        ED Triage Vitals [03/10/21 1512]   BP Temp Temp Source Pulse Resp SpO2 Height Weight   (!) 142/79 97.6 °F (36.4 °C) Temporal 79 18 96 % 5' 8\" (1.727 m) 231 lb (104.8 kg)       General:  NAD. Alert and Oriented. Well-appearing. Skin:  Warm, dry. Head:  Normocephalic. Atraumatic. Eyes:  EOMI. Conjunctiva normal.  ENT:  Oral mucosa moist.  Airway patent. Neck:  Supple. Normal ROM. Respiratory:  No respiratory distress. No labored breathing. Lungs clear without rales, rhonchi or wheezing. Cardiovascular:  Regular rate. No Murmur. No peripheral edema. Extremities warm and good color. Extremities:  Normal ROM. Nontender to palpation. Atraumatic. Back: Patient is starting with a small cluster of lesions to just right of the midline to his low back. Patient does have normal range of motion to the back. He can stand and sit easily. Straight leg raise is negative bilateral.  He has intact flexion and extension against resistance to both lower extremities, without pain. Good sensation down both legs. Neuro:  Alert and Oriented to person, place, time and situation. Normal LOC. Moves all extremities. Speech fluent. Psych:  Calm and Cooperative. Normal thought process. Normal judgement. Lab / Imaging Results   (All laboratory and radiology results have been personally reviewed by myself)  Labs:  No results found for this visit on 03/10/21. Imaging: All Radiology results interpreted by Radiologist unless otherwise noted. No orders to display       ED Course / Medical Decision Making   Medications - No data to display     Re-examination:  3/10/21       Time:   Patients condition . Consult(s):   None    Procedure(s):   none    MDM:   Diagnosis of shingles.

## 2021-03-10 NOTE — TELEPHONE ENCOUNTER
Patient says on a scale of 1-10 the  pain is 8 and it is constant pain , he don't know what he did it's been 4/5 days ago, he took some [place] and a medical marijuana pill  he had on hand it help but as soon as it wore off the pain was back .

## 2021-03-10 NOTE — TELEPHONE ENCOUNTER
Patient called says he  pulled his lower back more on the right side , its hard to get around.    Patient asking for orders for Xray of his back     Last seen 2/17/2021  Next appt 3/29/2021

## 2021-03-16 ENCOUNTER — HOSPITAL ENCOUNTER (EMERGENCY)
Age: 71
Discharge: HOME | End: 2021-03-16
Payer: MEDICARE

## 2021-03-16 VITALS
DIASTOLIC BLOOD PRESSURE: 87 MMHG | OXYGEN SATURATION: 98 % | BODY MASS INDEX: 35.46 KG/M2 | WEIGHT: 234 LBS | HEART RATE: 77 BPM | HEIGHT: 68 IN | RESPIRATION RATE: 20 BRPM | TEMPERATURE: 97.5 F | SYSTOLIC BLOOD PRESSURE: 141 MMHG

## 2021-03-16 DIAGNOSIS — I10 HYPERTENSION, UNSPECIFIED TYPE: ICD-10-CM

## 2021-03-16 DIAGNOSIS — B02.9 HERPES ZOSTER WITHOUT COMPLICATION: Primary | ICD-10-CM

## 2021-03-16 PROCEDURE — 99211 OFF/OP EST MAY X REQ PHY/QHP: CPT

## 2021-03-16 NOTE — ED PROVIDER NOTES
[address] [facility]  Department of Emergency Medicine  UC Encounter Note  3/16/21   1:10 PM EDT      NAME: Jess Soares  :  1950  MRN:  [id number]    Chief Complaint: Hypertension (started  feeling funny couple days ago  headache lightheaded    was  here last week for shingles    was given medication  thinks that might have done it)      This is a 68-year-old male the presents to urgent care stating last week he was in urgent care and was diagnosed with a shingles rash to his right lower back area. He was placed on antiviral medication as well as a steroid and some pain medication. He only took a few of the pain pills and states that the rash seems to have improved. He is still on the antiviral medication. He states he has been taking his blood pressure at home and noticed that its been running higher than usual. There is been a mild headache at times and some lightheadedness. He denies any chest pain or shortness of breath. No fevers or chills. Wonders if there is a side effect to the antiviral medication. On first contact patient he appears to be in no acute distress. Review of Systems  Pertinent positives and negatives are stated within HPI, all other systems reviewed and are negative. Physical Exam  Vitals signs and nursing note reviewed. Constitutional:       Appearance: He is well-developed. HENT:      Head: Normocephalic and atraumatic. Jaw: No trismus. Right Ear: Hearing, tympanic membrane, ear canal and external ear normal.      Left Ear: Hearing, tympanic membrane, ear canal and external ear normal.      Nose: Nose normal.      Right Sinus: No maxillary sinus tenderness or frontal sinus tenderness. Left Sinus: No maxillary sinus tenderness or frontal sinus tenderness. Mouth/Throat:      Pharynx: Uvula midline. No uvula swelling.    Eyes:      General: Lids are normal.      Conjunctiva/sclera: Conjunctivae normal.      Pupils: Pupils are equal, round, and reactive to light. Neck:      Musculoskeletal: Normal range of motion and neck supple. Cardiovascular:      Rate and Rhythm: Normal rate and regular rhythm. Heart sounds: Normal heart sounds. No murmur. Pulmonary:      Effort: Pulmonary effort is normal.      Breath sounds: Normal breath sounds. Abdominal:      General: Bowel sounds are normal.      Palpations: Abdomen is soft. Abdomen is not rigid. Tenderness: There is no abdominal tenderness. There is no guarding or rebound. Skin:     General: Skin is warm and dry. Findings: Rash present. No abrasion. Comments: Several healing scabs to right lumbar back. No weeping. No red streaking. Neurological:      Mental Status: He is alert and oriented to person, place, and time. GCS: GCS eye subscore is 4. GCS verbal subscore is 5. GCS motor subscore is 6. Cranial Nerves: Cranial nerves are intact. No cranial nerve deficit. Sensory: Sensation is intact. No sensory deficit. Motor: Motor function is intact. Coordination: Coordination is intact. Coordination normal.      Gait: Gait is intact. Gait normal.         Procedures    MDM  Number of Diagnoses or Management Options  Herpes zoster without complication  Hypertension, unspecified type  Diagnosis management comments: Valtrex has some side effects which he could have and we discussed that possibility. His blood pressure here is mildly elevated. He is neurologically intact. He denies any chest pain or shortness of breath I offered to do some blood work but noticed that he had some blood work drawn a month ago. I reviewed it. He does not want any blood work at this time. He states he will finish up the medication for the shingles infection and follow-up closely with his primary care provider. I told him to go to the ER symptoms worsen. Also I told him to take his blood pressure twice a day and write it down until he can see his primary care provider. --------------------------------------------- PAST HISTORY ---------------------------------------------  Past Medical History:  has a past medical history of Depression, Erectile dysfunction, GERD (gastroesophageal reflux disease), Glaucoma, Headache, Hemorrhoids, History of cardiovascular stress test, Hyperlipidemia, Hypogonadism male, Seizures ([facility] [address] and Sleep apnea. Past Surgical History:  has a past surgical history that includes Knee Arthroplasty (Left, 2013); Cardiac catheterization (2006); and joint replacement (Left, 12/2015). Social History:  reports that he quit smoking about 34 years ago. His smoking use included cigarettes. He has a 18.00 pack-year smoking history. He has quit using smokeless tobacco. He reports current alcohol use. He reports that he does not use drugs. Family History: family history is not on file. The patients home medications have been reviewed. Allergies: Patient has no known allergies. -------------------------------------------------- RESULTS -------------------------------------------------  No results found for this visit on 03/16/21. No orders to display       ------------------------- NURSING NOTES AND VITALS REVIEWED ---------------------------   The nursing notes within the ED encounter and vital signs as below have been reviewed. BP (!) 141/87   Pulse 77   Temp 97.5 °F (36.4 °C) (Infrared)   Resp 20   Ht 5' 8\" (1.727 m)   Wt 234 lb (106.1 kg)   SpO2 98%   BMI 35.58 kg/m²   Oxygen Saturation Interpretation: Normal      ------------------------------------------ PROGRESS NOTES ------------------------------------------   I have spoken with the patient and discussed todays results, in addition to providing specific details for the plan of care and counseling regarding the diagnosis and prognosis.   Their questions are answered at this time and they are agreeable with the plan.      --------------------------------- ADDITIONAL PROVIDER NOTES ---------------------------------     This patient is stable for discharge. I have shared the specific conditions for return, as well as the importance of follow-up. * NOTE: This report was transcribed using voice recognition software. Every effort was made to ensure accuracy; however, inadvertent computerized transcription errors may be present.    --------------------------------- IMPRESSION AND DISPOSITION ---------------------------------    IMPRESSION  1. Herpes zoster without complication Improving   2.  Hypertension, unspecified type        DISPOSITION  Disposition: Discharge to home  Patient condition is good       Ashli Fang PA-C  03/16/21 [id number]

## 2021-03-30 ENCOUNTER — OFFICE VISIT (OUTPATIENT)
Dept: FAMILY MEDICINE CLINIC | Age: 71
End: 2021-03-30
Payer: MEDICARE

## 2021-03-30 VITALS
OXYGEN SATURATION: 97 % | DIASTOLIC BLOOD PRESSURE: 90 MMHG | SYSTOLIC BLOOD PRESSURE: 140 MMHG | WEIGHT: 236 LBS | BODY MASS INDEX: 35.88 KG/M2 | HEART RATE: 70 BPM

## 2021-03-30 DIAGNOSIS — F32.A DEPRESSION, UNSPECIFIED DEPRESSION TYPE: ICD-10-CM

## 2021-03-30 DIAGNOSIS — E78.5 HYPERLIPIDEMIA, UNSPECIFIED HYPERLIPIDEMIA TYPE: ICD-10-CM

## 2021-03-30 DIAGNOSIS — G47.33 OBSTRUCTIVE SLEEP APNEA SYNDROME: ICD-10-CM

## 2021-03-30 DIAGNOSIS — Z82.49 FAMILY HISTORY OF EARLY CAD: ICD-10-CM

## 2021-03-30 DIAGNOSIS — B02.9 HERPES ZOSTER WITHOUT COMPLICATION: ICD-10-CM

## 2021-03-30 DIAGNOSIS — R73.9 HYPERGLYCEMIA: ICD-10-CM

## 2021-03-30 DIAGNOSIS — I10 ESSENTIAL HYPERTENSION: Primary | ICD-10-CM

## 2021-03-30 PROCEDURE — G8484 FLU IMMUNIZE NO ADMIN: HCPCS | Performed by: INTERNAL MEDICINE

## 2021-03-30 PROCEDURE — 99213 OFFICE O/P EST LOW 20 MIN: CPT | Performed by: INTERNAL MEDICINE

## 2021-03-30 PROCEDURE — 1123F ACP DISCUSS/DSCN MKR DOCD: CPT | Performed by: INTERNAL MEDICINE

## 2021-03-30 PROCEDURE — G8427 DOCREV CUR MEDS BY ELIG CLIN: HCPCS | Performed by: INTERNAL MEDICINE

## 2021-03-30 PROCEDURE — 3017F COLORECTAL CA SCREEN DOC REV: CPT | Performed by: INTERNAL MEDICINE

## 2021-03-30 PROCEDURE — 1036F TOBACCO NON-USER: CPT | Performed by: INTERNAL MEDICINE

## 2021-03-30 PROCEDURE — G8417 CALC BMI ABV UP PARAM F/U: HCPCS | Performed by: INTERNAL MEDICINE

## 2021-03-30 PROCEDURE — 4040F PNEUMOC VAC/ADMIN/RCVD: CPT | Performed by: INTERNAL MEDICINE

## 2021-03-30 RX ORDER — ESCITALOPRAM OXALATE 10 MG/1
10 TABLET ORAL DAILY
Qty: 90 TABLET | Refills: 1 | Status: SHIPPED
Start: 2021-03-30 | End: 2021-09-27

## 2021-03-30 ASSESSMENT — PATIENT HEALTH QUESTIONNAIRE - PHQ9
SUM OF ALL RESPONSES TO PHQ QUESTIONS 1-9: 0

## 2021-03-30 NOTE — PROGRESS NOTES
Subjective:     Chief Complaint   Patient presents with    Hypertension   Patient here to check on her blood pressure his blood pressure reading sometimes running high go to 140/90 at home  He denies any headache no chest pain or palpitation  Has gained weight not watching the diet  Has joined a gym program      Has sleep apnea using CPAP machine helping  He would prefer to try the mouthguard    He had a stress test     He had herpes zoster in the back seen at urgent care treated with prednisone and Valtrex denies any pain        Past Medical History:   Diagnosis Date    Depression     Erectile dysfunction     GERD (gastroesophageal reflux disease)     Glaucoma     Headache     Hemorrhoids     History of cardiovascular stress test 2019    Stress ECHO    Hyperlipidemia     Hypogonadism male     Seizures ([facility] [address]     Sleep apnea         Social History     Socioeconomic History    Marital status:      Spouse name: Not on file    Number of children: Not on file    Years of education: Not on file    Highest education level: Not on file   Occupational History    Not on file   Social Needs    Financial resource strain: Not on file    Food insecurity     Worry: Never true     Inability: Never true    Transportation needs     Medical: No     Non-medical: No   Tobacco Use    Smoking status: Former Smoker     Packs/day: 1.00     Years: 18.00     Pack years: 18.00     Types: Cigarettes     Quit date: 9/3/1986     Years since quittin.5    Smokeless tobacco: Former User   Substance and Sexual Activity    Alcohol use: Yes     Comment: socially    Drug use: Never    Sexual activity: Yes     Partners: Female   Lifestyle    Physical activity     Days per week: Not on file     Minutes per session: Not on file    Stress: Not on file   Relationships    Social connections     Talks on phone: Not on file     Gets together: Not on file     Attends [employer] service: Not on file     Active simvastatin (ZOCOR) 20 MG tablet Take 10 mg by mouth nightly        No current facility-administered medications for this visit.          Last 3 BMP  Lab Results   Component Value Date/Time     02/17/2021 01:50 PM     12/18/2020 08:30 AM    K 4.5 02/17/2021 01:50 PM    K 4.7 12/18/2020 08:30 AM     02/17/2021 01:50 PM     12/18/2020 08:30 AM    CO2 26 02/17/2021 01:50 PM    CO2 22 12/18/2020 08:30 AM    BUN 17 02/17/2021 01:50 PM    BUN 15 12/18/2020 08:30 AM    CREATININE 1.0 02/17/2021 01:50 PM    CREATININE 1.0 12/18/2020 08:30 AM    GLUCOSE 138 (H) 02/17/2021 01:50 PM    GLUCOSE 105 (H) 12/18/2020 08:30 AM    CALCIUM 9.7 02/17/2021 01:50 PM    CALCIUM 9.8 12/18/2020 08:30 AM       Last 3 CMP:    Lab Results   Component Value Date/Time     02/17/2021 01:50 PM     12/18/2020 08:30 AM    K 4.5 02/17/2021 01:50 PM    K 4.7 12/18/2020 08:30 AM     02/17/2021 01:50 PM     12/18/2020 08:30 AM    CO2 26 02/17/2021 01:50 PM    CO2 22 12/18/2020 08:30 AM    BUN 17 02/17/2021 01:50 PM    BUN 15 12/18/2020 08:30 AM    CREATININE 1.0 02/17/2021 01:50 PM    CREATININE 1.0 12/18/2020 08:30 AM    GLUCOSE 138 (H) 02/17/2021 01:50 PM    GLUCOSE 105 (H) 12/18/2020 08:30 AM    CALCIUM 9.7 02/17/2021 01:50 PM    CALCIUM 9.8 12/18/2020 08:30 AM    PROT 7.9 02/17/2021 01:50 PM    PROT 7.7 12/18/2020 08:30 AM    LABALBU 4.4 02/17/2021 01:50 PM    LABALBU 4.4 12/18/2020 08:30 AM    BILITOT 0.2 02/17/2021 01:50 PM    BILITOT 0.5 12/18/2020 08:30 AM    ALKPHOS 96 02/17/2021 01:50 PM    ALKPHOS 79 12/18/2020 08:30 AM    AST 45 (H) 02/17/2021 01:50 PM    AST 34 12/18/2020 08:30 AM    ALT 31 02/17/2021 01:50 PM    ALT 28 12/18/2020 08:30 AM        CBC:   Lab Results   Component Value Date/Time    WBC 6.8 12/18/2020 08:30 AM    RBC 5.01 12/18/2020 08:30 AM    HGB 14.7 12/18/2020 08:30 AM    HCT 44.0 12/18/2020 08:30 AM    MCV 87.8 12/18/2020 08:30 AM    MCH 29.3 12/18/2020 08:30 AM    MCHC 33.4 12/18/2020 08:30 AM    RDW 13.1 12/18/2020 08:30 AM     12/18/2020 08:30 AM    MPV 9.6 12/18/2020 08:30 AM       A1C:  Lab Results   Component Value Date/Time    LABA1C 4.8 05/01/2019    LABA1C 4.8 05/01/2019       Lipid panel:  Lab Results   Component Value Date    CHOL 118 02/17/2021    CHOL 123 12/18/2020    CHOL 111 06/19/2020    TRIG 159 02/17/2021    TRIG 105 12/18/2020    TRIG 99 06/19/2020    HDL 30 02/17/2021    HDL 33 12/18/2020    HDL 33 06/19/2020        Lab Results   Component Value Date/Time    PROT 7.9 02/17/2021 01:50 PM    PROT 7.7 12/18/2020 08:30 AM       No results found for: MG      Assessment. Robert Benjamin was seen today for hypertension. Diagnoses and all orders for this visit:    Essential hypertension    Hyperglycemia  -     HEMOGLOBIN A1C; Future    Hyperlipidemia, unspecified hyperlipidemia type  -     COMPREHENSIVE METABOLIC PANEL; Future  -     LIPID PANEL; Future    Obstructive sleep apnea syndrome    Herpes zoster without complication    Family history of early CAD    Depression, unspecified depression type    Other orders  -     escitalopram (LEXAPRO) 10 MG tablet;  Take 1 tablet by mouth daily       Patient Active Problem List   Diagnosis    Depression    Hyperlipidemia    Obstructive sleep apnea syndrome       Plan: New onset of hypertension  Low-salt diet regular exercise lose weight patient will like to wait on medication treatment  Recheck blood pressure in 3 months if still running high then he will need medication    Lipidemia doing very well with current medication continue Zocor and niacin    Depression doing much better with Lexapro no side effect, denies any suicidal ideation      Herpes zoster resolved    Apnea using CPAP machine helping compliant  He can check with the dentist for mouthguard      \"Diet modification discussed    CMP lipid A1c before next visit  Follow with Dr. J Luis Do every couple of years patient will make his own appointment    Return in about 3 months (around 6/30/2021).        Hunter Pedroza MD  11:13 AM  3/30/2021     [place]

## 2021-04-05 ENCOUNTER — TELEPHONE (OUTPATIENT)
Dept: FAMILY MEDICINE CLINIC | Age: 71
End: 2021-04-05

## 2021-04-05 RX ORDER — SIMVASTATIN 20 MG
20 TABLET ORAL NIGHTLY
Qty: 90 TABLET | Refills: 1 | Status: SHIPPED
Start: 2021-04-05 | End: 2021-10-01

## 2021-04-05 NOTE — TELEPHONE ENCOUNTER
Patient called for a refill and also asked if Dr. Junie Rothman will send RX for acid reflux/heartburn which he had problems with before. Patient stated he had taken RX previously.       Last seen 3/30/2021  Next appt 7/23/2021  [facility]

## 2021-04-07 NOTE — PROGRESS NOTES
Error    Patient is requesting a refill of:    omeprazole (PRILOSEC) 20 MG delayed release capsule          Last seen 3/30/2021  Next appt 7/23/2021

## 2021-04-09 RX ORDER — PANTOPRAZOLE SODIUM 40 MG/1
40 TABLET, DELAYED RELEASE ORAL
Qty: 90 TABLET | Refills: 1 | Status: SHIPPED
Start: 2021-04-09 | End: 2021-11-03 | Stop reason: SDUPTHER

## 2021-06-10 ENCOUNTER — HOSPITAL ENCOUNTER (EMERGENCY)
Age: 71
Discharge: HOME | End: 2021-06-10
Attending: EMERGENCY MEDICINE
Payer: MEDICARE

## 2021-06-10 ENCOUNTER — TELEPHONE (OUTPATIENT)
Dept: FAMILY MEDICINE CLINIC | Age: 71
End: 2021-06-10

## 2021-06-10 VITALS
HEART RATE: 81 BPM | DIASTOLIC BLOOD PRESSURE: 99 MMHG | SYSTOLIC BLOOD PRESSURE: 164 MMHG | OXYGEN SATURATION: 96 % | RESPIRATION RATE: 19 BRPM | TEMPERATURE: 98.4 F

## 2021-06-10 DIAGNOSIS — I10 UNCONTROLLED HYPERTENSION: Primary | ICD-10-CM

## 2021-06-10 LAB
ALBUMIN SERPL-MCNC: 3.9 G/DL (ref 3.5–5.2)
ALP BLD-CCNC: 106 U/L (ref 40–129)
ALT SERPL-CCNC: 36 U/L (ref 0–40)
ANION GAP SERPL CALCULATED.3IONS-SCNC: 12 MMOL/L (ref 7–16)
AST SERPL-CCNC: 60 U/L (ref 0–39)
BASOPHILS ABSOLUTE: 0.05 E9/L (ref 0–0.2)
BASOPHILS RELATIVE PERCENT: 0.7 % (ref 0–2)
BILIRUB SERPL-MCNC: 0.5 MG/DL (ref 0–1.2)
BILIRUBIN URINE: NEGATIVE
BLOOD, URINE: NEGATIVE
BUN BLDV-MCNC: 14 MG/DL (ref 6–23)
CALCIUM SERPL-MCNC: 9.3 MG/DL (ref 8.6–10.2)
CHLORIDE BLD-SCNC: 105 MMOL/L (ref 98–107)
CLARITY: CLEAR
CO2: 22 MMOL/L (ref 22–29)
COLOR: YELLOW
CREAT SERPL-MCNC: 0.8 MG/DL (ref 0.7–1.2)
EKG ATRIAL RATE: 65 BPM
EKG P AXIS: 17 DEGREES
EKG P-R INTERVAL: 208 MS
EKG Q-T INTERVAL: 432 MS
EKG QRS DURATION: 86 MS
EKG QTC CALCULATION (BAZETT): 449 MS
EKG R AXIS: 18 DEGREES
EKG T AXIS: 31 DEGREES
EKG VENTRICULAR RATE: 65 BPM
EOSINOPHILS ABSOLUTE: 0.12 E9/L (ref 0.05–0.5)
EOSINOPHILS RELATIVE PERCENT: 1.6 % (ref 0–6)
GFR AFRICAN AMERICAN: >60
GFR NON-AFRICAN AMERICAN: >60 ML/MIN/1.73
GLUCOSE BLD-MCNC: 84 MG/DL (ref 74–99)
GLUCOSE URINE: NEGATIVE MG/DL
HCT VFR BLD CALC: 42.4 % (ref 37–54)
HEMOGLOBIN: 14.1 G/DL (ref 12.5–16.5)
IMMATURE GRANULOCYTES #: 0.03 E9/L
IMMATURE GRANULOCYTES %: 0.4 % (ref 0–5)
KETONES, URINE: NEGATIVE MG/DL
LEUKOCYTE ESTERASE, URINE: NEGATIVE
LYMPHOCYTES ABSOLUTE: 2 E9/L (ref 1.5–4)
LYMPHOCYTES RELATIVE PERCENT: 27.4 % (ref 20–42)
MCH RBC QN AUTO: 28.7 PG (ref 26–35)
MCHC RBC AUTO-ENTMCNC: 33.3 % (ref 32–34.5)
MCV RBC AUTO: 86.4 FL (ref 80–99.9)
MONOCYTES ABSOLUTE: 0.61 E9/L (ref 0.1–0.95)
MONOCYTES RELATIVE PERCENT: 8.3 % (ref 2–12)
NEUTROPHILS ABSOLUTE: 4.5 E9/L (ref 1.8–7.3)
NEUTROPHILS RELATIVE PERCENT: 61.6 % (ref 43–80)
NITRITE, URINE: NEGATIVE
PDW BLD-RTO: 12.9 FL (ref 11.5–15)
PH UA: 6 (ref 5–9)
PLATELET # BLD: 202 E9/L (ref 130–450)
PMV BLD AUTO: 8.8 FL (ref 7–12)
POTASSIUM REFLEX MAGNESIUM: 4.1 MMOL/L (ref 3.5–5)
PROTEIN UA: NEGATIVE MG/DL
RBC # BLD: 4.91 E12/L (ref 3.8–5.8)
REASON FOR REJECTION: NORMAL
REJECTED TEST: NORMAL
SODIUM BLD-SCNC: 139 MMOL/L (ref 132–146)
SPECIFIC GRAVITY UA: >=1.03 (ref 1–1.03)
TOTAL PROTEIN: 7 G/DL (ref 6.4–8.3)
UROBILINOGEN, URINE: 0.2 E.U./DL
WBC # BLD: 7.3 E9/L (ref 4.5–11.5)

## 2021-06-10 PROCEDURE — 93005 ELECTROCARDIOGRAM TRACING: CPT | Performed by: PHYSICIAN ASSISTANT

## 2021-06-10 PROCEDURE — 99283 EMERGENCY DEPT VISIT LOW MDM: CPT

## 2021-06-10 PROCEDURE — 80053 COMPREHEN METABOLIC PANEL: CPT

## 2021-06-10 PROCEDURE — 85025 COMPLETE CBC W/AUTO DIFF WBC: CPT

## 2021-06-10 PROCEDURE — 2500000003 HC RX 250 WO HCPCS: Performed by: EMERGENCY MEDICINE

## 2021-06-10 PROCEDURE — 93010 ELECTROCARDIOGRAM REPORT: CPT | Performed by: INTERNAL MEDICINE

## 2021-06-10 PROCEDURE — 36415 COLL VENOUS BLD VENIPUNCTURE: CPT

## 2021-06-10 PROCEDURE — 96374 THER/PROPH/DIAG INJ IV PUSH: CPT

## 2021-06-10 PROCEDURE — 81003 URINALYSIS AUTO W/O SCOPE: CPT

## 2021-06-10 RX ORDER — AMLODIPINE BESYLATE 2.5 MG/1
2.5 TABLET ORAL DAILY
Qty: 30 TABLET | Refills: 0 | Status: SHIPPED | OUTPATIENT
Start: 2021-06-10 | End: 2022-06-15 | Stop reason: SDUPTHER

## 2021-06-10 RX ORDER — LABETALOL HYDROCHLORIDE 5 MG/ML
10 INJECTION, SOLUTION INTRAVENOUS ONCE
Status: COMPLETED | OUTPATIENT
Start: 2021-06-10 | End: 2021-06-10

## 2021-06-10 RX ADMIN — LABETALOL HYDROCHLORIDE 10 MG: 5 INJECTION INTRAVENOUS at 17:55

## 2021-06-10 ASSESSMENT — ENCOUNTER SYMPTOMS
EYE REDNESS: 0
VOMITING: 0
ABDOMINAL PAIN: 0
SHORTNESS OF BREATH: 0
NAUSEA: 0

## 2021-06-10 NOTE — TELEPHONE ENCOUNTER
I spoke to the patient his blood pressure elevated to 210/105 repeat blood pressure 199/99  Patient having some neck pain and headache advised to go to the ER may require IV medication if the patient is in that range then he will be followed with the p.o. medication he agreed to go to ER

## 2021-06-10 NOTE — ED PROVIDER NOTES
Chief complaint: Hypertension      HPI:  6/10/21, Time: 4:42 PM EDT    HPI             Galo Dey is a 70 y.o. male presenting to the ED for hypertension. The history is obtained from the patient as well as the patient's medical record. Patient is presenting today for hypertension. The patient states that he has no history of hypertension. Over the last 3 days he has noted that his blood pressure has been elevated. He states he has had one reading that was greater than 777 systolic. This seems to be constant over the last 3 days. Nothing is better. Not makes worse. Moderate in severity. No treatment prior to arrival.  The patient called his primary care physician and was directed to the emergency department. The patient denies any headache, blurred vision, numbness, weakness or paresthesias of the extremities. There are no associated fevers, chills, lightheadedness, nasal congestion, chest pain, shortness of breath, cough, nausea, vomiting, abdominal pain, diarrhea, constipation, dysuria or hematuria. The patient has no other stated complaints at this time. ROS:   Review of Systems   Constitutional: Negative for chills and fever. HENT: Negative for congestion. Eyes: Negative for redness. Respiratory: Negative for shortness of breath. Cardiovascular: Negative for chest pain. Gastrointestinal: Negative for abdominal pain, nausea and vomiting. Genitourinary: Negative for dysuria. Musculoskeletal: Negative for arthralgias. Skin: Negative for rash. Neurological: Negative for light-headedness. Psychiatric/Behavioral: Negative for confusion.    All other systems reviewed and are negative.      --------------------------------------------- PAST HISTORY ---------------------------------------------  Past Medical History:  has a past medical history of Depression, Erectile dysfunction, GERD (gastroesophageal reflux disease), Glaucoma, Headache, Hemorrhoids, History of cardiovascular stress test, Hyperlipidemia, Hypogonadism male, Seizures ([address] and Sleep apnea. Past Surgical History:  has a past surgical history that includes Knee Arthroplasty (Left, 2013); Cardiac catheterization (2006); and joint replacement (Left, 12/2015). Social History:  reports that he quit smoking about 34 years ago. His smoking use included cigarettes. He has a 18.00 pack-year smoking history. He has quit using smokeless tobacco. He reports current alcohol use. He reports that he does not use drugs. Family History: family history is not on file. The patients home medications have been reviewed. Allergies: Patient has no known allergies. ---------------------------------------------------PHYSICAL EXAM--------------------------------------      Constitutional/General: Alert and oriented x3, well appearing, non toxic in NAD  Head: Normocephalic and atraumatic  Mouth: Oropharynx clear, handling secretions, no trismus  Neck: Supple, full ROM,  Pulmonary: Lungs clear to auscultation bilaterally, no wheezes, rales, or rhonchi. Not in respiratory distress  Cardiovascular:  Regular rate. Regular rhythm. No murmurs  Chest: no chest wall tenderness  Abdomen: Soft. Non tender. Non distended. No rebound, guarding, or rigidity. No pulsatile masses appreciated. Musculoskeletal: Moves all extremities x 4. Warm and well perfused, no clubbing, cyanosis, or edema. Capillary refill <3 seconds  Skin: warm and dry. No rashes. Neurologic: GCS 15, no gross focal neurologic deficits  Psych: Normal Affect    -------------------------------------------------- RESULTS -------------------------------------------------  I have personally reviewed all laboratory and imaging results for this patient. Results are listed below.      LABS:  Results for orders placed or performed during the hospital encounter of 06/10/21   Comprehensive Metabolic Panel w/ Reflex to MG   Result Value Ref Range    Sodium 139 132 - 146 mmol/L    Potassium reflex Magnesium 4.1 3.5 - 5.0 mmol/L    Chloride 105 98 - 107 mmol/L    CO2 22 22 - 29 mmol/L    Anion Gap 12 7 - 16 mmol/L    Glucose 84 74 - 99 mg/dL    BUN 14 6 - 23 mg/dL    CREATININE 0.8 0.7 - 1.2 mg/dL    GFR Non-African American >60 >=60 mL/min/1.73    GFR African American >60     Calcium 9.3 8.6 - 10.2 mg/dL    Total Protein 7.0 6.4 - 8.3 g/dL    Albumin 3.9 3.5 - 5.2 g/dL    Total Bilirubin 0.5 0.0 - 1.2 mg/dL    Alkaline Phosphatase 106 40 - 129 U/L    ALT 36 0 - 40 U/L    AST 60 (H) 0 - 39 U/L   Urinalysis, reflex to microscopic   Result Value Ref Range    Color, UA Yellow Straw/Yellow    Clarity, UA Clear Clear    Glucose, Ur Negative Negative mg/dL    Bilirubin Urine Negative Negative    Ketones, Urine Negative Negative mg/dL    Specific Gravity, UA >=1.030 1.005 - 1.030    Blood, Urine Negative Negative    pH, UA 6.0 5.0 - 9.0    Protein, UA Negative Negative mg/dL    Urobilinogen, Urine 0.2 <2.0 E.U./dL    Nitrite, Urine Negative Negative    Leukocyte Esterase, Urine Negative Negative   SPECIMEN REJECTION   Result Value Ref Range    Rejected Test cwd     Reason for Rejection see below    CBC auto differential   Result Value Ref Range    WBC 7.3 4.5 - 11.5 E9/L    RBC 4.91 3.80 - 5.80 E12/L    Hemoglobin 14.1 12.5 - 16.5 g/dL    Hematocrit 42.4 37.0 - 54.0 %    MCV 86.4 80.0 - 99.9 fL    MCH 28.7 26.0 - 35.0 pg    MCHC 33.3 32.0 - 34.5 %    RDW 12.9 11.5 - 15.0 fL    Platelets 620 151 - 853 E9/L    MPV 8.8 7.0 - 12.0 fL    Neutrophils % 61.6 43.0 - 80.0 %    Immature Granulocytes % 0.4 0.0 - 5.0 %    Lymphocytes % 27.4 20.0 - 42.0 %    Monocytes % 8.3 2.0 - 12.0 %    Eosinophils % 1.6 0.0 - 6.0 %    Basophils % 0.7 0.0 - 2.0 %    Neutrophils Absolute 4.50 1.80 - 7.30 E9/L    Immature Granulocytes # 0.03 E9/L    Lymphocytes Absolute 2.00 1.50 - 4.00 E9/L    Monocytes Absolute 0.61 0.10 - 0.95 E9/L    Eosinophils Absolute 0.12 0.05 - 0.50 E9/L    Basophils Absolute 0.05 0.00 - 0.20 E9/L   EKG 12 Lead   Result Value Ref Range    Ventricular Rate 65 BPM    Atrial Rate 65 BPM    P-R Interval 208 ms    QRS Duration 86 ms    Q-T Interval 432 ms    QTc Calculation (Bazett) 449 ms    P Axis 17 degrees    R Axis 18 degrees    T Axis 31 degrees       RADIOLOGY:  Interpreted by Radiologist.  No orders to display         EKG: This EKG is signed and interpreted by me. Normal sinus rhythm, rate of 65, no ST segment elevation or depression, DE interval 2 8 MS, QRS 86 MS,  MS  Interpreted by me      ------------------------- NURSING NOTES AND VITALS REVIEWED ---------------------------   The nursing notes within the ED encounter and vital signs as below have been reviewed by myself. BP (!) 164/99   Pulse 81   Temp 98.4 °F (36.9 °C) (Oral)   Resp 19   SpO2 96%   Oxygen Saturation Interpretation: Normal    The patients available past medical records and past encounters were reviewed. ------------------------------ ED COURSE/MEDICAL DECISION MAKING----------------------  Medications   labetalol (NORMODYNE;TRANDATE) injection 10 mg (10 mg Intravenous Given 6/10/21 1755)             Medical Decision Making:   I, Dr. Renny Torres am the primary physician of record. Claire Ag is a 70 y.o. male who presents to the ED for hypertension differential diagnosis includes but is not limited to hypertension, acute kidney injury the patient does have a past medical history of   has a past medical history of Depression, Erectile dysfunction, GERD (gastroesophageal reflux disease), Glaucoma, Headache, Hemorrhoids, History of cardiovascular stress test, Hyperlipidemia, Hypogonadism male, Seizures ([address] and Sleep apnea. . The patient was given labetalol, patient has CBC, CMP which is fairly unremarkable. No evidence of endorgan damage. The patient will be discharged home on Norvasc.         Re-Evaluations/Consultations:             ED Course as of Jun 11 1411   Th Shiva 10, 2021   [id number] Spoke with Dr. Tom Walton will go home on [facility]. [MT]      ED Course User Index  [MT] Virgil Carey DO       Patient is in the bed no acute distress. Results discussed. Patient be discharged. This patient's ED course included: History, physical examination, reevaluation prior to disposition, labs, IV medication    This patient has remained hemodynamically stable during their ED course. Counseling: The emergency provider has spoken with the patient and discussed todays results, in addition to providing specific details for the plan of care and counseling regarding the diagnosis and prognosis. Questions are answered at this time and they are agreeable with the plan.       --------------------------------- IMPRESSION AND DISPOSITION ---------------------------------    IMPRESSION  1. Uncontrolled hypertension        DISPOSITION  Disposition: Discharge to home  Patient condition is stable        NOTE: This report was transcribed using voice recognition software.  Every effort was made to ensure accuracy; however, inadvertent computerized transcription errors may be present         Virgil Carey DO  06/11/21 [id number]

## 2021-06-10 NOTE — ED TRIAGE NOTES
FIRST PROVIDER CONTACT ASSESSMENT NOTE      Department of Emergency Medicine   Admit Date: No admission date for patient encounter. Chief Complaint: Hypertension (states bp has been high, pcp told him to come in for fluids. )      History of Present Illness:    Galo Dey is a 70 y.o. male who presents to the ED for evaded blood pressure. Patient states in March he had elevated blood pressure at doctor's office. Told to check it. Got a home cuff. States for the past week has been very high 200+/100+. Patient states he just \"does not feel right. \"  Does admit to a headache. Some neck pain.   States he was sent in by his PCP to get his pressure down.        -----------------END OF FIRST PROVIDER CONTACT ASSESSMENT NOTE--------------  Electronically signed by FRANCISCO JAVIER Elias   DD: 6/10/21

## 2021-06-15 ENCOUNTER — OFFICE VISIT (OUTPATIENT)
Dept: FAMILY MEDICINE CLINIC | Age: 71
End: 2021-06-15
Payer: MEDICARE

## 2021-06-15 VITALS
OXYGEN SATURATION: 98 % | HEART RATE: 75 BPM | WEIGHT: 232 LBS | SYSTOLIC BLOOD PRESSURE: 142 MMHG | BODY MASS INDEX: 35.28 KG/M2 | DIASTOLIC BLOOD PRESSURE: 90 MMHG | TEMPERATURE: 96.8 F

## 2021-06-15 DIAGNOSIS — I10 ESSENTIAL HYPERTENSION: Primary | ICD-10-CM

## 2021-06-15 DIAGNOSIS — K21.9 GASTROESOPHAGEAL REFLUX DISEASE WITHOUT ESOPHAGITIS: ICD-10-CM

## 2021-06-15 DIAGNOSIS — R06.09 DYSPNEA ON EXERTION: ICD-10-CM

## 2021-06-15 DIAGNOSIS — E78.5 HYPERLIPIDEMIA, UNSPECIFIED HYPERLIPIDEMIA TYPE: ICD-10-CM

## 2021-06-15 DIAGNOSIS — R74.8 ELEVATED LIVER ENZYMES: ICD-10-CM

## 2021-06-15 PROCEDURE — 1123F ACP DISCUSS/DSCN MKR DOCD: CPT | Performed by: INTERNAL MEDICINE

## 2021-06-15 PROCEDURE — 3017F COLORECTAL CA SCREEN DOC REV: CPT | Performed by: INTERNAL MEDICINE

## 2021-06-15 PROCEDURE — 1036F TOBACCO NON-USER: CPT | Performed by: INTERNAL MEDICINE

## 2021-06-15 PROCEDURE — 99213 OFFICE O/P EST LOW 20 MIN: CPT | Performed by: INTERNAL MEDICINE

## 2021-06-15 PROCEDURE — G8417 CALC BMI ABV UP PARAM F/U: HCPCS | Performed by: INTERNAL MEDICINE

## 2021-06-15 PROCEDURE — G8427 DOCREV CUR MEDS BY ELIG CLIN: HCPCS | Performed by: INTERNAL MEDICINE

## 2021-06-15 PROCEDURE — 4040F PNEUMOC VAC/ADMIN/RCVD: CPT | Performed by: INTERNAL MEDICINE

## 2021-06-15 RX ORDER — VALSARTAN 80 MG/1
80 TABLET ORAL DAILY
Qty: 30 TABLET | Refills: 2 | Status: SHIPPED
Start: 2021-06-15 | End: 2021-09-07

## 2021-06-15 NOTE — PROGRESS NOTES
Subjective:     Chief Complaint   Patient presents with    Hypertension   Patient here follow-up on hypertension  Patient called me on Elizabeth 10, 2021 that his blood pressure was running 1  systolic and  diastolic with multiple readings    He was advised to go to the ER, he did go to the ER his potassium was elevated he was given IV labetalol and was discharged home on amlodipine 2.5 daily    His blood pressure has been coming down to around 039 systolic and 90 diastolic    He has no prior history of hypertension, he denies any chest pain or palpitation  Denies any headache or dizziness  No tingling numbness  No nausea vomiting, no blurred vision  Denies any pleuritic symptoms    Past Medical History:   Diagnosis Date    Depression     Erectile dysfunction     GERD (gastroesophageal reflux disease)     Glaucoma     Headache     Hemorrhoids     History of cardiovascular stress test 2019    Stress ECHO    Hyperlipidemia     Hypogonadism male     Seizures ([facility] [address]     Sleep apnea         Social History     Socioeconomic History    Marital status:      Spouse name: Not on file    Number of children: Not on file    Years of education: Not on file    Highest education level: Not on file   Occupational History    Not on file   Tobacco Use    Smoking status: Former Smoker     Packs/day: 1.00     Years: 18.00     Pack years: 18.00     Types: Cigarettes     Quit date: 9/3/1986     Years since quittin.8    Smokeless tobacco: Former User   Vaping Use    Vaping Use: Never used   Substance and Sexual Activity    Alcohol use: Yes     Comment: socially    Drug use: Never    Sexual activity: Yes     Partners: Female   Other Topics Concern    Not on file   Social History Narrative    Not on file     Social Determinants of Health     Financial Resource Strain:     Difficulty of Paying Living Expenses:    Food Insecurity: No Food Insecurity    Worried About Running Out of Food in the Last Year: Never true    Ran Out of Food in the Last Year: Never true   Transportation Needs: No Transportation Needs    Lack of Transportation (Medical): No    Lack of Transportation (Non-Medical): No   Physical Activity:     Days of Exercise per Week:     Minutes of Exercise per Session:    Stress:     Feeling of Stress :    Social Connections:     Frequency of Communication with Friends and Family:     Frequency of Social Gatherings with Friends and Family:     Attends [employer] Services:     Active Member of Clubs or Organizations:     Attends Club or Organization Meetings:     Marital Status:    Intimate Partner Violence:     Fear of Current or Ex-Partner:     Emotionally Abused:     Physically Abused:     Sexually Abused:         Past Surgical History:   Procedure Laterality Date    CARDIAC CATHETERIZATION  2006    JOINT REPLACEMENT Left 12/2015    knee    KNEE ARTHROPLASTY Left 2013        No family history on file. No Known Allergies     ROS  No acute distress  Cardiac: Denies any chest pain or palpitation  No history of coronary artery disease he sees Dr. Joanna Neil in the past  Stress test 2019 - by Dr. Thao Perea  Respiratory: Denies any cough or shortness of breath    GI: No abdominal pain. Denies any nausea vomiting or diarrhea   Colonoscopy December 2019 with Dr. Romain Mon  : Denies any dysuria frequency or hematuria  Neuro: No headache or dizziness  Endocrine: No diabetes  Skin: normal  No recent weight gain or weight loss  Denies any change in vision    Objective:    BP (!) 142/90   Pulse 75   Temp 96.8 °F (36 °C)   Wt 232 lb (105.2 kg)   SpO2 98%   BMI 35.28 kg/m²     Constitutional: Alert awake and oriented  Eyes: Pupils equal bilaterally. Extraocular muscles intact  Neck: no JVD adenopathy no bruit  Heart:  RRR, no murmurs, gallops, or rubs.   Lungs:    no wheeze, rales or rhonchi  Abd: bowel sounds present, nontender, nondistended, no masses  Extrem:  No clubbing, cyanosis, or edema  Neuro: AAOx3,No Focal deficit  Psychological: no depression or anxiety       Current Outpatient Medications   Medication Sig Dispense Refill    valsartan (DIOVAN) 80 MG tablet Take 1 tablet by mouth daily 30 tablet 2    amLODIPine (NORVASC) 2.5 MG tablet Take 1 tablet by mouth daily 30 tablet 0    pantoprazole (PROTONIX) 40 MG tablet Take 1 tablet by mouth every morning (before breakfast) 90 tablet 1    simvastatin (ZOCOR) 20 MG tablet Take 1 tablet by mouth nightly 90 tablet 1    escitalopram (LEXAPRO) 10 MG tablet Take 1 tablet by mouth daily 90 tablet 1    niacin 500 MG tablet Take 1 tablet by mouth daily 30 tablet 3    aspirin 81 MG EC tablet Take 81 mg by mouth daily       No current facility-administered medications for this visit.         Last 3 BMP  Lab Results   Component Value Date/Time     06/10/2021 04:39 PM     02/17/2021 01:50 PM     12/18/2020 08:30 AM    K 4.1 06/10/2021 04:39 PM    K 4.5 02/17/2021 01:50 PM    K 4.7 12/18/2020 08:30 AM     06/10/2021 04:39 PM     02/17/2021 01:50 PM     12/18/2020 08:30 AM    CO2 22 06/10/2021 04:39 PM    CO2 26 02/17/2021 01:50 PM    CO2 22 12/18/2020 08:30 AM    BUN 14 06/10/2021 04:39 PM    BUN 17 02/17/2021 01:50 PM    BUN 15 12/18/2020 08:30 AM    CREATININE 0.8 06/10/2021 04:39 PM    CREATININE 1.0 02/17/2021 01:50 PM    CREATININE 1.0 12/18/2020 08:30 AM    GLUCOSE 84 06/10/2021 04:39 PM    GLUCOSE 138 (H) 02/17/2021 01:50 PM    GLUCOSE 105 (H) 12/18/2020 08:30 AM    CALCIUM 9.3 06/10/2021 04:39 PM    CALCIUM 9.7 02/17/2021 01:50 PM    CALCIUM 9.8 12/18/2020 08:30 AM       Last 3 CMP:    Lab Results   Component Value Date/Time     06/10/2021 04:39 PM     02/17/2021 01:50 PM     12/18/2020 08:30 AM    K 4.1 06/10/2021 04:39 PM    K 4.5 02/17/2021 01:50 PM    K 4.7 12/18/2020 08:30 AM     06/10/2021 04:39 PM     02/17/2021 01:50 PM     12/18/2020 08:30 AM    CO2 22 06/10/2021 04:39 PM    CO2 26 02/17/2021 01:50 PM    CO2 22 12/18/2020 08:30 AM    BUN 14 06/10/2021 04:39 PM    BUN 17 02/17/2021 01:50 PM    BUN 15 12/18/2020 08:30 AM    CREATININE 0.8 06/10/2021 04:39 PM    CREATININE 1.0 02/17/2021 01:50 PM    CREATININE 1.0 12/18/2020 08:30 AM    GLUCOSE 84 06/10/2021 04:39 PM    GLUCOSE 138 (H) 02/17/2021 01:50 PM    GLUCOSE 105 (H) 12/18/2020 08:30 AM    CALCIUM 9.3 06/10/2021 04:39 PM    CALCIUM 9.7 02/17/2021 01:50 PM    CALCIUM 9.8 12/18/2020 08:30 AM    PROT 7.0 06/10/2021 04:39 PM    PROT 7.9 02/17/2021 01:50 PM    PROT 7.7 12/18/2020 08:30 AM    LABALBU 3.9 06/10/2021 04:39 PM    LABALBU 4.4 02/17/2021 01:50 PM    LABALBU 4.4 12/18/2020 08:30 AM    BILITOT 0.5 06/10/2021 04:39 PM    BILITOT 0.2 02/17/2021 01:50 PM    BILITOT 0.5 12/18/2020 08:30 AM    ALKPHOS 106 06/10/2021 04:39 PM    ALKPHOS 96 02/17/2021 01:50 PM    ALKPHOS 79 12/18/2020 08:30 AM    AST 60 (H) 06/10/2021 04:39 PM    AST 45 (H) 02/17/2021 01:50 PM    AST 34 12/18/2020 08:30 AM    ALT 36 06/10/2021 04:39 PM    ALT 31 02/17/2021 01:50 PM    ALT 28 12/18/2020 08:30 AM        CBC:   Lab Results   Component Value Date/Time    WBC 7.3 06/10/2021 05:15 PM    RBC 4.91 06/10/2021 05:15 PM    HGB 14.1 06/10/2021 05:15 PM    HCT 42.4 06/10/2021 05:15 PM    MCV 86.4 06/10/2021 05:15 PM    MCH 28.7 06/10/2021 05:15 PM    MCHC 33.3 06/10/2021 05:15 PM    RDW 12.9 06/10/2021 05:15 PM     06/10/2021 05:15 PM    MPV 8.8 06/10/2021 05:15 PM       A1C:  Lab Results   Component Value Date/Time    LABA1C 4.8 05/01/2019 12:00 AM    LABA1C 4.8 05/01/2019 12:00 AM       Lipid panel:  Lab Results   Component Value Date    CHOL 118 02/17/2021    CHOL 123 12/18/2020    CHOL 111 06/19/2020    TRIG 159 02/17/2021    TRIG 105 12/18/2020    TRIG 99 06/19/2020    HDL 30 02/17/2021    HDL 33 12/18/2020    HDL 33 06/19/2020        Lab Results   Component Value Date/Time    PROT 7.0 06/10/2021 04:39 PM    PROT 7.9 02/17/2021 01:50 PM    PROT 7.7 12/18/2020 08:30 AM       No results found for: MG      Assessment. Coty Pineda was seen today for hypertension. Diagnoses and all orders for this visit:    Essential hypertension  -     XR CHEST (2 VW); Future    Dyspnea on exertion  -     XR CHEST (2 VW); Future    Elevated liver enzymes  -     Cancel: US LIVER; Future  -     Cancel: US GALLBLADDER RUQ; Future  -     US ABDOMEN LIMITED; Future    Hyperlipidemia, unspecified hyperlipidemia type    Gastroesophageal reflux disease without esophagitis    Other orders  -     valsartan (DIOVAN) 80 MG tablet; Take 1 tablet by mouth daily       Patient Active Problem List   Diagnosis    Depression    Hyperlipidemia    Obstructive sleep apnea syndrome    Essential hypertension       Plan: New onset of hypertension requiring IV labetalol in the ER months  Patient on amlodipine 2.5 impression improvement  I checked the blood pressure with his machine and my machine almost similar he still has elevated blood pressure but not as bad as on Elizabeth 10,, 2021  Add valsartan 80 mg daily, will try to wean off the Norvasc    Advised to see the cardiologist he will call Dr. Nicole Barlow to make the appointment    Labs reviewed his AST elevated to 68 145 before ultrasound right upper quadrant to assess the liver and the gallbladder      Hyperlipidemia continue current medication    GE reflux disease doing better with the Protonix      Overall feeling better follow back next month    Sleep apnea using CPAP machine which is working patient is compliant  No follow-ups on file.        Gary Farmer MD  4:18 PM  6/15/2021     [place]

## 2021-06-16 DIAGNOSIS — E78.5 HYPERLIPIDEMIA, UNSPECIFIED HYPERLIPIDEMIA TYPE: ICD-10-CM

## 2021-06-16 DIAGNOSIS — R73.9 HYPERGLYCEMIA: ICD-10-CM

## 2021-06-16 LAB
ALBUMIN SERPL-MCNC: 4.1 G/DL (ref 3.5–5.2)
ALP BLD-CCNC: 102 U/L (ref 40–129)
ALT SERPL-CCNC: 39 U/L (ref 0–40)
ANION GAP SERPL CALCULATED.3IONS-SCNC: 11 MMOL/L (ref 7–16)
AST SERPL-CCNC: 49 U/L (ref 0–39)
BILIRUB SERPL-MCNC: 0.5 MG/DL (ref 0–1.2)
BUN BLDV-MCNC: 11 MG/DL (ref 6–23)
CALCIUM SERPL-MCNC: 9.4 MG/DL (ref 8.6–10.2)
CHLORIDE BLD-SCNC: 103 MMOL/L (ref 98–107)
CHOLESTEROL, TOTAL: 118 MG/DL (ref 0–199)
CO2: 26 MMOL/L (ref 22–29)
CREAT SERPL-MCNC: 1 MG/DL (ref 0.7–1.2)
GFR AFRICAN AMERICAN: >60
GFR NON-AFRICAN AMERICAN: >60 ML/MIN/1.73
GLUCOSE BLD-MCNC: 105 MG/DL (ref 74–99)
HBA1C MFR BLD: 5.2 % (ref 4–5.6)
HDLC SERPL-MCNC: 29 MG/DL
LDL CHOLESTEROL CALCULATED: 70 MG/DL (ref 0–99)
POTASSIUM SERPL-SCNC: 4.2 MMOL/L (ref 3.5–5)
SODIUM BLD-SCNC: 140 MMOL/L (ref 132–146)
TOTAL PROTEIN: 7.5 G/DL (ref 6.4–8.3)
TRIGL SERPL-MCNC: 96 MG/DL (ref 0–149)
VLDLC SERPL CALC-MCNC: 19 MG/DL

## 2021-09-07 RX ORDER — VALSARTAN 80 MG/1
TABLET ORAL
Qty: 30 TABLET | Refills: 2 | Status: SHIPPED
Start: 2021-09-07 | End: 2021-12-02

## 2021-09-27 RX ORDER — ESCITALOPRAM OXALATE 10 MG/1
TABLET ORAL
Qty: 90 TABLET | Refills: 0 | Status: SHIPPED
Start: 2021-09-27 | End: 2021-12-23

## 2021-10-01 RX ORDER — SIMVASTATIN 20 MG
TABLET ORAL
Qty: 90 TABLET | Refills: 1 | Status: SHIPPED | OUTPATIENT
Start: 2021-10-01 | End: 2022-03-29 | Stop reason: SDUPTHER

## 2021-10-15 DIAGNOSIS — I10 ESSENTIAL HYPERTENSION: Primary | ICD-10-CM

## 2021-10-15 DIAGNOSIS — R74.8 ELEVATED LIVER ENZYMES: ICD-10-CM

## 2021-10-15 DIAGNOSIS — I10 ESSENTIAL HYPERTENSION: ICD-10-CM

## 2021-10-15 DIAGNOSIS — E78.5 HYPERLIPIDEMIA, UNSPECIFIED HYPERLIPIDEMIA TYPE: ICD-10-CM

## 2021-10-15 DIAGNOSIS — R73.9 HYPERGLYCEMIA: ICD-10-CM

## 2021-10-15 LAB
ALBUMIN SERPL-MCNC: 4.3 G/DL (ref 3.5–5.2)
ALP BLD-CCNC: 117 U/L (ref 40–129)
ALT SERPL-CCNC: 26 U/L (ref 0–40)
ANION GAP SERPL CALCULATED.3IONS-SCNC: 16 MMOL/L (ref 7–16)
AST SERPL-CCNC: 35 U/L (ref 0–39)
BILIRUB SERPL-MCNC: 0.6 MG/DL (ref 0–1.2)
BUN BLDV-MCNC: 16 MG/DL (ref 6–23)
CALCIUM SERPL-MCNC: 9.9 MG/DL (ref 8.6–10.2)
CHLORIDE BLD-SCNC: 105 MMOL/L (ref 98–107)
CHOLESTEROL, TOTAL: 104 MG/DL (ref 0–199)
CO2: 19 MMOL/L (ref 22–29)
CREAT SERPL-MCNC: 1.1 MG/DL (ref 0.7–1.2)
GFR AFRICAN AMERICAN: >60
GFR NON-AFRICAN AMERICAN: >60 ML/MIN/1.73
GLUCOSE BLD-MCNC: 127 MG/DL (ref 74–99)
HBA1C MFR BLD: 5.1 % (ref 4–5.6)
HDLC SERPL-MCNC: 30 MG/DL
LDL CHOLESTEROL CALCULATED: 58 MG/DL (ref 0–99)
POTASSIUM SERPL-SCNC: 4.3 MMOL/L (ref 3.5–5)
SODIUM BLD-SCNC: 140 MMOL/L (ref 132–146)
TOTAL PROTEIN: 7.8 G/DL (ref 6.4–8.3)
TRIGL SERPL-MCNC: 82 MG/DL (ref 0–149)
VLDLC SERPL CALC-MCNC: 16 MG/DL

## 2021-10-20 ENCOUNTER — OFFICE VISIT (OUTPATIENT)
Dept: FAMILY MEDICINE CLINIC | Age: 71
End: 2021-10-20
Payer: MEDICARE

## 2021-10-20 VITALS
DIASTOLIC BLOOD PRESSURE: 80 MMHG | WEIGHT: 228 LBS | HEART RATE: 92 BPM | OXYGEN SATURATION: 100 % | BODY MASS INDEX: 34.67 KG/M2 | SYSTOLIC BLOOD PRESSURE: 130 MMHG | TEMPERATURE: 97.7 F

## 2021-10-20 DIAGNOSIS — R16.0 HEPATOMEGALY: ICD-10-CM

## 2021-10-20 DIAGNOSIS — F32.A DEPRESSION, UNSPECIFIED DEPRESSION TYPE: ICD-10-CM

## 2021-10-20 DIAGNOSIS — I10 PRIMARY HYPERTENSION: ICD-10-CM

## 2021-10-20 DIAGNOSIS — G47.33 OBSTRUCTIVE SLEEP APNEA SYNDROME: ICD-10-CM

## 2021-10-20 DIAGNOSIS — E78.5 HYPERLIPIDEMIA, UNSPECIFIED HYPERLIPIDEMIA TYPE: ICD-10-CM

## 2021-10-20 DIAGNOSIS — M54.50 ACUTE RIGHT-SIDED LOW BACK PAIN, UNSPECIFIED WHETHER SCIATICA PRESENT: ICD-10-CM

## 2021-10-20 DIAGNOSIS — M54.50 ACUTE RIGHT-SIDED LOW BACK PAIN, UNSPECIFIED WHETHER SCIATICA PRESENT: Primary | ICD-10-CM

## 2021-10-20 DIAGNOSIS — M54.50 RIGHT LUMBAR PAIN: ICD-10-CM

## 2021-10-20 LAB
ALBUMIN SERPL-MCNC: 4.6 G/DL (ref 3.5–5.2)
ALP BLD-CCNC: 122 U/L (ref 40–129)
ALT SERPL-CCNC: 35 U/L (ref 0–40)
AMYLASE: 52 U/L (ref 20–100)
ANION GAP SERPL CALCULATED.3IONS-SCNC: 15 MMOL/L (ref 7–16)
AST SERPL-CCNC: 45 U/L (ref 0–39)
BASOPHILS ABSOLUTE: 0.04 E9/L (ref 0–0.2)
BASOPHILS RELATIVE PERCENT: 0.4 % (ref 0–2)
BILIRUB SERPL-MCNC: 0.7 MG/DL (ref 0–1.2)
BILIRUBIN URINE: NEGATIVE
BLOOD, URINE: NEGATIVE
BUN BLDV-MCNC: 15 MG/DL (ref 6–23)
CALCIUM SERPL-MCNC: 9.6 MG/DL (ref 8.6–10.2)
CHLORIDE BLD-SCNC: 99 MMOL/L (ref 98–107)
CLARITY: CLEAR
CO2: 21 MMOL/L (ref 22–29)
COLOR: YELLOW
CREAT SERPL-MCNC: 0.9 MG/DL (ref 0.7–1.2)
EOSINOPHILS ABSOLUTE: 0.03 E9/L (ref 0.05–0.5)
EOSINOPHILS RELATIVE PERCENT: 0.3 % (ref 0–6)
GFR AFRICAN AMERICAN: >60
GFR NON-AFRICAN AMERICAN: >60 ML/MIN/1.73
GLUCOSE BLD-MCNC: 84 MG/DL (ref 74–99)
GLUCOSE URINE: NEGATIVE MG/DL
HCT VFR BLD CALC: 42 % (ref 37–54)
HEMOGLOBIN: 14.3 G/DL (ref 12.5–16.5)
IMMATURE GRANULOCYTES #: 0.02 E9/L
IMMATURE GRANULOCYTES %: 0.2 % (ref 0–5)
KETONES, URINE: ABNORMAL MG/DL
LEUKOCYTE ESTERASE, URINE: NEGATIVE
LIPASE: 32 U/L (ref 13–60)
LYMPHOCYTES ABSOLUTE: 1.12 E9/L (ref 1.5–4)
LYMPHOCYTES RELATIVE PERCENT: 12.3 % (ref 20–42)
MCH RBC QN AUTO: 29.2 PG (ref 26–35)
MCHC RBC AUTO-ENTMCNC: 34 % (ref 32–34.5)
MCV RBC AUTO: 85.7 FL (ref 80–99.9)
MONOCYTES ABSOLUTE: 0.73 E9/L (ref 0.1–0.95)
MONOCYTES RELATIVE PERCENT: 8 % (ref 2–12)
NEUTROPHILS ABSOLUTE: 7.13 E9/L (ref 1.8–7.3)
NEUTROPHILS RELATIVE PERCENT: 78.8 % (ref 43–80)
NITRITE, URINE: NEGATIVE
PDW BLD-RTO: 13.3 FL (ref 11.5–15)
PH UA: 6 (ref 5–9)
PLATELET # BLD: 213 E9/L (ref 130–450)
PMV BLD AUTO: 9.4 FL (ref 7–12)
POTASSIUM SERPL-SCNC: 4.3 MMOL/L (ref 3.5–5)
PROTEIN UA: NEGATIVE MG/DL
RBC # BLD: 4.9 E12/L (ref 3.8–5.8)
SODIUM BLD-SCNC: 135 MMOL/L (ref 132–146)
SPECIFIC GRAVITY UA: 1.02 (ref 1–1.03)
TOTAL PROTEIN: 7.5 G/DL (ref 6.4–8.3)
UROBILINOGEN, URINE: 0.2 E.U./DL
WBC # BLD: 9.1 E9/L (ref 4.5–11.5)

## 2021-10-20 PROCEDURE — G8417 CALC BMI ABV UP PARAM F/U: HCPCS | Performed by: INTERNAL MEDICINE

## 2021-10-20 PROCEDURE — 99214 OFFICE O/P EST MOD 30 MIN: CPT | Performed by: INTERNAL MEDICINE

## 2021-10-20 PROCEDURE — 3017F COLORECTAL CA SCREEN DOC REV: CPT | Performed by: INTERNAL MEDICINE

## 2021-10-20 PROCEDURE — G8427 DOCREV CUR MEDS BY ELIG CLIN: HCPCS | Performed by: INTERNAL MEDICINE

## 2021-10-20 PROCEDURE — 1036F TOBACCO NON-USER: CPT | Performed by: INTERNAL MEDICINE

## 2021-10-20 PROCEDURE — G8484 FLU IMMUNIZE NO ADMIN: HCPCS | Performed by: INTERNAL MEDICINE

## 2021-10-20 PROCEDURE — 1123F ACP DISCUSS/DSCN MKR DOCD: CPT | Performed by: INTERNAL MEDICINE

## 2021-10-20 PROCEDURE — 4040F PNEUMOC VAC/ADMIN/RCVD: CPT | Performed by: INTERNAL MEDICINE

## 2021-10-20 RX ORDER — MELOXICAM 15 MG/1
15 TABLET ORAL DAILY
Qty: 30 TABLET | Refills: 0 | Status: SHIPPED | OUTPATIENT
Start: 2021-10-20 | End: 2022-02-02

## 2021-10-20 RX ORDER — CYCLOBENZAPRINE HCL 10 MG
10 TABLET ORAL NIGHTLY PRN
Qty: 30 TABLET | Refills: 0 | Status: SHIPPED | OUTPATIENT
Start: 2021-10-20 | End: 2021-11-19

## 2021-10-20 NOTE — PROGRESS NOTES
Subjective:     Chief Complaint   Patient presents with    Back Pain   Complains of pain in the right lumbar area, mid back area for the past 5 days,  He sometimes gets back pain but this pain is different, it is a continuous pain,  It gets worse by movement, gets worse when turning in the bed,  It is a continuous dull pain, 5 out of 10,  Ibuprofen helps a little bit,  Sometimes feels nauseous after eating,  Denies any dysuria frequency hematuria,  Had a good bowel movement, no constipation, no diarrhea,    Feeling tired for some time,  He had ultrasound right upper quadrant in 2021 which was okay    Follow-up on hypertension and the lab report  He had slightly elevated liver enzyme last time which has returned to normal, ultrasound right upper quadrant was normal    He has depression and anxiety because wife has major depression he has to deal with that it makes him depressed Lexapro helping denies any suicidal ideation    Past Medical History:   Diagnosis Date    Depression     Erectile dysfunction     GERD (gastroesophageal reflux disease)     Glaucoma     Headache     Hemorrhoids     History of cardiovascular stress test 2019    Stress ECHO    Hyperlipidemia     Hypogonadism male     Seizures ([address]     Sleep apnea         Social History     Socioeconomic History    Marital status:      Spouse name: Not on file    Number of children: Not on file    Years of education: Not on file    Highest education level: Not on file   Occupational History    Not on file   Tobacco Use    Smoking status: Former Smoker     Packs/day: 1.00     Years: 18.00     Pack years: 18.00     Types: Cigarettes     Quit date: 9/3/1986     Years since quittin.1    Smokeless tobacco: Former User   Vaping Use    Vaping Use: Never used   Substance and Sexual Activity    Alcohol use: Yes     Comment: socially    Drug use: Never    Sexual activity: Yes     Partners: Female   Other Topics Concern    Not on file   Social History Narrative    Not on file     Social Determinants of Health     Financial Resource Strain:     Difficulty of Paying Living Expenses:    Food Insecurity: No Food Insecurity    Worried About Running Out of Food in the Last Year: Never true    Tobias of Food in the Last Year: Never true   Transportation Needs: No Transportation Needs    Lack of Transportation (Medical): No    Lack of Transportation (Non-Medical): No   Physical Activity:     Days of Exercise per Week:     Minutes of Exercise per Session:    Stress:     Feeling of Stress :    Social Connections:     Frequency of Communication with Friends and Family:     Frequency of Social Gatherings with Friends and Family:     Attends [employer] Services:     Active Member of Clubs or Organizations:     Attends Club or Organization Meetings:     Marital Status:    Intimate Partner Violence:     Fear of Current or Ex-Partner:     Emotionally Abused:     Physically Abused:     Sexually Abused:         Past Surgical History:   Procedure Laterality Date    CARDIAC CATHETERIZATION  2006    JOINT REPLACEMENT Left 12/2015    knee    KNEE ARTHROPLASTY Left 2013        History reviewed. No pertinent family history. No Known Allergies     ROS  No acute distress  Cardiac: Denies any chest pain or palpitation  Hypertension controlled on current medication  Stress test 2019 - Dr. Dorota Crawford  No history of CAD  Seen Dr. Dorota Crawford June 2021  Respiratory: Denies any cough or shortness of breath  GI: No abdominal pain.  Denies any nausea vomiting or diarrhea  Colonoscopy December 2019 dr hein  : Denies any dysuria frequency or hematuria  Neuro: No headache or dizziness  Endocrine: No diabetes  Skin: normal  No recent weight gain or weight loss  Denies any change in vision  Complains of right lumbar pain, right mid back pain, etiology not clear  Objective:    /80   Pulse 92   Temp 97.7 °F (36.5 °C)   Wt 228 lb (103.4 kg) SpO2 100%   BMI 34.67 kg/m²     Constitutional: Alert awake and oriented  Eyes: Pupils equal bilaterally. Extraocular muscles intact  Neck: no JVD adenopathy no bruit  Heart:  RRR, no murmurs, gallops, or rubs. Lungs:    no wheeze, rales or rhonchi  Abd: bowel sounds present, nontender, nondistended, no masses  Nontender in the abdomen  No masses felt  No guarding, no rigidity,  No rebound tenderness  Extrem:  No clubbing, cyanosis, or edema  Neuro: AAOx3,No Focal deficit  Psychological: no depression or anxiety   Back no reproducible tenderness  Discomfort right CVA angle    Current Outpatient Medications   Medication Sig Dispense Refill    cyclobenzaprine (FLEXERIL) 10 MG tablet Take 1 tablet by mouth nightly as needed for Muscle spasms 30 tablet 0    meloxicam (MOBIC) 15 MG tablet Take 1 tablet by mouth daily 30 tablet 0    simvastatin (ZOCOR) 20 MG tablet TAKE ONE TABLET BY MOUTH NIGHTLY 90 tablet 1    escitalopram (LEXAPRO) 10 MG tablet TAKE ONE TABLET BY MOUTH DAILY 90 tablet 0    valsartan (DIOVAN) 80 MG tablet TAKE ONE TABLET BY MOUTH DAILY 30 tablet 2    amLODIPine (NORVASC) 2.5 MG tablet Take 1 tablet by mouth daily 30 tablet 0    pantoprazole (PROTONIX) 40 MG tablet Take 1 tablet by mouth every morning (before breakfast) 90 tablet 1    niacin 500 MG tablet Take 1 tablet by mouth daily 30 tablet 3    aspirin 81 MG EC tablet Take 81 mg by mouth daily       No current facility-administered medications for this visit.         Last 3 BMP  Lab Results   Component Value Date/Time     10/15/2021 07:59 AM     06/16/2021 07:58 AM     06/10/2021 04:39 PM    K 4.3 10/15/2021 07:59 AM    K 4.2 06/16/2021 07:58 AM    K 4.1 06/10/2021 04:39 PM    K 4.5 02/17/2021 01:50 PM     10/15/2021 07:59 AM     06/16/2021 07:58 AM     06/10/2021 04:39 PM    CO2 19 (L) 10/15/2021 07:59 AM    CO2 26 06/16/2021 07:58 AM    CO2 22 06/10/2021 04:39 PM    BUN 16 10/15/2021 07:59 AM    BUN 11 06/16/2021 07:58 AM    BUN 14 06/10/2021 04:39 PM    CREATININE 1.1 10/15/2021 07:59 AM    CREATININE 1.0 06/16/2021 07:58 AM    CREATININE 0.8 06/10/2021 04:39 PM    GLUCOSE 127 (H) 10/15/2021 07:59 AM    GLUCOSE 105 (H) 06/16/2021 07:58 AM    GLUCOSE 84 06/10/2021 04:39 PM    CALCIUM 9.9 10/15/2021 07:59 AM    CALCIUM 9.4 06/16/2021 07:58 AM    CALCIUM 9.3 06/10/2021 04:39 PM       Last 3 CMP:    Lab Results   Component Value Date/Time     10/15/2021 07:59 AM     06/16/2021 07:58 AM     06/10/2021 04:39 PM    K 4.3 10/15/2021 07:59 AM    K 4.2 06/16/2021 07:58 AM    K 4.1 06/10/2021 04:39 PM    K 4.5 02/17/2021 01:50 PM     10/15/2021 07:59 AM     06/16/2021 07:58 AM     06/10/2021 04:39 PM    CO2 19 (L) 10/15/2021 07:59 AM    CO2 26 06/16/2021 07:58 AM    CO2 22 06/10/2021 04:39 PM    BUN 16 10/15/2021 07:59 AM    BUN 11 06/16/2021 07:58 AM    BUN 14 06/10/2021 04:39 PM    CREATININE 1.1 10/15/2021 07:59 AM    CREATININE 1.0 06/16/2021 07:58 AM    CREATININE 0.8 06/10/2021 04:39 PM    GLUCOSE 127 (H) 10/15/2021 07:59 AM    GLUCOSE 105 (H) 06/16/2021 07:58 AM    GLUCOSE 84 06/10/2021 04:39 PM    CALCIUM 9.9 10/15/2021 07:59 AM    CALCIUM 9.4 06/16/2021 07:58 AM    CALCIUM 9.3 06/10/2021 04:39 PM    PROT 7.8 10/15/2021 07:59 AM    PROT 7.5 06/16/2021 07:58 AM    PROT 7.0 06/10/2021 04:39 PM    LABALBU 4.3 10/15/2021 07:59 AM    LABALBU 4.1 06/16/2021 07:58 AM    LABALBU 3.9 06/10/2021 04:39 PM    BILITOT 0.6 10/15/2021 07:59 AM    BILITOT 0.5 06/16/2021 07:58 AM    BILITOT 0.5 06/10/2021 04:39 PM    ALKPHOS 117 10/15/2021 07:59 AM    ALKPHOS 102 06/16/2021 07:58 AM    ALKPHOS 106 06/10/2021 04:39 PM    AST 35 10/15/2021 07:59 AM    AST 49 (H) 06/16/2021 07:58 AM    AST 60 (H) 06/10/2021 04:39 PM    ALT 26 10/15/2021 07:59 AM    ALT 39 06/16/2021 07:58 AM    ALT 36 06/10/2021 04:39 PM        CBC:   Lab Results   Component Value Date/Time    WBC 7.3 06/10/2021 05:15 PM    RBC 4.91 06/10/2021 05:15 PM    HGB 14.1 06/10/2021 05:15 PM    HCT 42.4 06/10/2021 05:15 PM    MCV 86.4 06/10/2021 05:15 PM    MCH 28.7 06/10/2021 05:15 PM    MCHC 33.3 06/10/2021 05:15 PM    RDW 12.9 06/10/2021 05:15 PM     06/10/2021 05:15 PM    MPV 8.8 06/10/2021 05:15 PM       A1C:  Lab Results   Component Value Date/Time    LABA1C 5.1 10/15/2021 07:59 AM       Lipid panel:  Lab Results   Component Value Date    CHOL 104 10/15/2021    CHOL 118 06/16/2021    CHOL 118 02/17/2021    TRIG 82 10/15/2021    TRIG 96 06/16/2021    TRIG 159 02/17/2021    HDL 30 10/15/2021    HDL 29 06/16/2021    HDL 30 02/17/2021        Lab Results   Component Value Date/Time    PROT 7.8 10/15/2021 07:59 AM    PROT 7.5 06/16/2021 07:58 AM    PROT 7.0 06/10/2021 04:39 PM       No results found for: MG      Assessment. Delia Lincoln was seen today for back pain. Diagnoses and all orders for this visit:    Acute right-sided low back pain, unspecified whether sciatica present  -     CBC WITH AUTO DIFFERENTIAL; Future  -     POC Urine with Microscopic  -     CT ABDOMEN PELVIS W IV CONTRAST Additional Contrast? Oral; Future    Hepatomegaly  -     CT ABDOMEN PELVIS W IV CONTRAST Additional Contrast? Oral; Future  -     COMPREHENSIVE METABOLIC PANEL; Future  -     AMYLASE; Future  -     LIPASE; Future    Right lumbar pain  -     CT ABDOMEN PELVIS W IV CONTRAST Additional Contrast? Oral; Future  -     COMPREHENSIVE METABOLIC PANEL; Future  -     AMYLASE; Future  -     LIPASE; Future    Primary hypertension    Obstructive sleep apnea syndrome    Hyperlipidemia, unspecified hyperlipidemia type    Depression, unspecified depression type    Other orders  -     cyclobenzaprine (FLEXERIL) 10 MG tablet; Take 1 tablet by mouth nightly as needed for Muscle spasms  -     meloxicam (MOBIC) 15 MG tablet;  Take 1 tablet by mouth daily       Patient Active Problem List   Diagnosis    Depression    Hyperlipidemia    Obstructive sleep apnea syndrome    Essential hypertension       Plan: Right lumbar pain rule renal calculus  CBC with differential, CMP, amylase lipase, urinalysis  Mobic 15 mg daily, Flexeril 10 mg at night, go to ER if worsening of symptoms,  CT abdomen pelvis with IV and oral contrast requested  To be done as soon as possible      Hypertension better controlled continue valsartan BUN/creatinine are normal    Struct of sleep apnea not using CPAP machine using mouthguard    Hyperlipidemia doing well with Zocor  Continue 20    Depression controlled on Lexapro      Follow back in 2 weeks  Go to ER if worse    Return in about 2 weeks (around 11/3/2021).        Chau Masterson MD  12:06 PM  10/20/2021     [place]

## 2021-10-22 ENCOUNTER — TELEPHONE (OUTPATIENT)
Dept: FAMILY MEDICINE CLINIC | Age: 71
End: 2021-10-22

## 2021-10-22 NOTE — TELEPHONE ENCOUNTER
----- Message from Velma Walter sent at 10/22/2021 12:21 PM EDT -----  Subject: Message to Provider    QUESTIONS  Information for Provider? wants flu shot on the 4th when he comes in   ---------------------------------------------------------------------------  --------------  [address]  What is the best way for the office to contact you? OK to leave message on   voicemail  Preferred Call Back Phone Number?  [phone]  ---------------------------------------------------------------------------  --------------  SCRIPT ANSWERS  undefined

## 2021-10-23 ENCOUNTER — HOSPITAL ENCOUNTER (EMERGENCY)
Age: 71
Discharge: HOME | End: 2021-10-23
Attending: EMERGENCY MEDICINE
Payer: MEDICARE

## 2021-10-23 ENCOUNTER — APPOINTMENT (OUTPATIENT)
Dept: CT IMAGING | Age: 71
End: 2021-10-23
Payer: MEDICARE

## 2021-10-23 VITALS
OXYGEN SATURATION: 97 % | HEART RATE: 62 BPM | RESPIRATION RATE: 18 BRPM | DIASTOLIC BLOOD PRESSURE: 78 MMHG | SYSTOLIC BLOOD PRESSURE: 124 MMHG | TEMPERATURE: 96.9 F

## 2021-10-23 DIAGNOSIS — R10.9 RIGHT FLANK PAIN: Primary | ICD-10-CM

## 2021-10-23 DIAGNOSIS — R91.1 PULMONARY NODULE: ICD-10-CM

## 2021-10-23 LAB
ALBUMIN SERPL-MCNC: 4.4 G/DL (ref 3.5–5.2)
ALP BLD-CCNC: 122 U/L (ref 40–129)
ALT SERPL-CCNC: 34 U/L (ref 0–40)
ANION GAP SERPL CALCULATED.3IONS-SCNC: 11 MMOL/L (ref 7–16)
AST SERPL-CCNC: 43 U/L (ref 0–39)
BASOPHILS ABSOLUTE: 0.05 E9/L (ref 0–0.2)
BASOPHILS RELATIVE PERCENT: 0.7 % (ref 0–2)
BILIRUB SERPL-MCNC: 0.5 MG/DL (ref 0–1.2)
BILIRUBIN URINE: NEGATIVE
BLOOD, URINE: NEGATIVE
BUN BLDV-MCNC: 16 MG/DL (ref 6–23)
CALCIUM SERPL-MCNC: 9.4 MG/DL (ref 8.6–10.2)
CHLORIDE BLD-SCNC: 103 MMOL/L (ref 98–107)
CLARITY: CLEAR
CO2: 23 MMOL/L (ref 22–29)
COLOR: YELLOW
CREAT SERPL-MCNC: 0.9 MG/DL (ref 0.7–1.2)
EOSINOPHILS ABSOLUTE: 0.21 E9/L (ref 0.05–0.5)
EOSINOPHILS RELATIVE PERCENT: 3 % (ref 0–6)
GFR AFRICAN AMERICAN: >60
GFR NON-AFRICAN AMERICAN: >60 ML/MIN/1.73
GLUCOSE BLD-MCNC: 85 MG/DL (ref 74–99)
GLUCOSE URINE: NEGATIVE MG/DL
HCT VFR BLD CALC: 39.3 % (ref 37–54)
HEMOGLOBIN: 13.7 G/DL (ref 12.5–16.5)
IMMATURE GRANULOCYTES #: 0.02 E9/L
IMMATURE GRANULOCYTES %: 0.3 % (ref 0–5)
KETONES, URINE: NEGATIVE MG/DL
LEUKOCYTE ESTERASE, URINE: NEGATIVE
LYMPHOCYTES ABSOLUTE: 2.07 E9/L (ref 1.5–4)
LYMPHOCYTES RELATIVE PERCENT: 29.6 % (ref 20–42)
MCH RBC QN AUTO: 29.1 PG (ref 26–35)
MCHC RBC AUTO-ENTMCNC: 34.9 % (ref 32–34.5)
MCV RBC AUTO: 83.4 FL (ref 80–99.9)
MONOCYTES ABSOLUTE: 0.65 E9/L (ref 0.1–0.95)
MONOCYTES RELATIVE PERCENT: 9.3 % (ref 2–12)
NEUTROPHILS ABSOLUTE: 3.99 E9/L (ref 1.8–7.3)
NEUTROPHILS RELATIVE PERCENT: 57.1 % (ref 43–80)
NITRITE, URINE: NEGATIVE
PDW BLD-RTO: 12.6 FL (ref 11.5–15)
PH UA: 7.5 (ref 5–9)
PLATELET # BLD: 190 E9/L (ref 130–450)
PMV BLD AUTO: 8.7 FL (ref 7–12)
POTASSIUM REFLEX MAGNESIUM: 4.1 MMOL/L (ref 3.5–5)
PROTEIN UA: NEGATIVE MG/DL
RBC # BLD: 4.71 E12/L (ref 3.8–5.8)
SODIUM BLD-SCNC: 137 MMOL/L (ref 132–146)
SPECIFIC GRAVITY UA: 1.01 (ref 1–1.03)
TOTAL PROTEIN: 7.8 G/DL (ref 6.4–8.3)
UROBILINOGEN, URINE: 0.2 E.U./DL
WBC # BLD: 7 E9/L (ref 4.5–11.5)

## 2021-10-23 PROCEDURE — 96374 THER/PROPH/DIAG INJ IV PUSH: CPT

## 2021-10-23 PROCEDURE — 74176 CT ABD & PELVIS W/O CONTRAST: CPT

## 2021-10-23 PROCEDURE — 99284 EMERGENCY DEPT VISIT MOD MDM: CPT

## 2021-10-23 PROCEDURE — 6360000004 HC RX CONTRAST MEDICATION: Performed by: RADIOLOGY

## 2021-10-23 PROCEDURE — 74174 CTA ABD&PLVS W/CONTRAST: CPT

## 2021-10-23 PROCEDURE — 81003 URINALYSIS AUTO W/O SCOPE: CPT

## 2021-10-23 PROCEDURE — 2580000003 HC RX 258: Performed by: EMERGENCY MEDICINE

## 2021-10-23 PROCEDURE — 6360000002 HC RX W HCPCS: Performed by: EMERGENCY MEDICINE

## 2021-10-23 PROCEDURE — 80053 COMPREHEN METABOLIC PANEL: CPT

## 2021-10-23 PROCEDURE — 96375 TX/PRO/DX INJ NEW DRUG ADDON: CPT

## 2021-10-23 PROCEDURE — 71275 CT ANGIOGRAPHY CHEST: CPT

## 2021-10-23 PROCEDURE — 85025 COMPLETE CBC W/AUTO DIFF WBC: CPT

## 2021-10-23 RX ORDER — LIDOCAINE 50 MG/G
1 PATCH TOPICAL EVERY 24 HOURS
Qty: 10 PATCH | Refills: 0 | Status: SHIPPED | OUTPATIENT
Start: 2021-10-23 | End: 2021-11-02

## 2021-10-23 RX ORDER — FENTANYL CITRATE 50 UG/ML
75 INJECTION, SOLUTION INTRAMUSCULAR; INTRAVENOUS ONCE
Status: COMPLETED | OUTPATIENT
Start: 2021-10-23 | End: 2021-10-23

## 2021-10-23 RX ORDER — 0.9 % SODIUM CHLORIDE 0.9 %
1000 INTRAVENOUS SOLUTION INTRAVENOUS ONCE
Status: COMPLETED | OUTPATIENT
Start: 2021-10-23 | End: 2021-10-23

## 2021-10-23 RX ORDER — KETOROLAC TROMETHAMINE 15 MG/ML
15 INJECTION, SOLUTION INTRAMUSCULAR; INTRAVENOUS ONCE
Status: COMPLETED | OUTPATIENT
Start: 2021-10-23 | End: 2021-10-23

## 2021-10-23 RX ORDER — ONDANSETRON 2 MG/ML
4 INJECTION INTRAMUSCULAR; INTRAVENOUS ONCE
Status: COMPLETED | OUTPATIENT
Start: 2021-10-23 | End: 2021-10-23

## 2021-10-23 RX ADMIN — KETOROLAC TROMETHAMINE 15 MG: 15 INJECTION, SOLUTION INTRAMUSCULAR; INTRAVENOUS at 12:28

## 2021-10-23 RX ADMIN — FENTANYL CITRATE 75 MCG: 0.05 INJECTION, SOLUTION INTRAMUSCULAR; INTRAVENOUS at 12:26

## 2021-10-23 RX ADMIN — IOPAMIDOL 75 ML: 755 INJECTION, SOLUTION INTRAVENOUS at 15:18

## 2021-10-23 RX ADMIN — ONDANSETRON 4 MG: 2 INJECTION INTRAMUSCULAR; INTRAVENOUS at 12:26

## 2021-10-23 RX ADMIN — SODIUM CHLORIDE 1000 ML: 9 INJECTION, SOLUTION INTRAVENOUS at 12:22

## 2021-10-23 ASSESSMENT — PAIN DESCRIPTION - ORIENTATION: ORIENTATION: RIGHT

## 2021-10-23 ASSESSMENT — ENCOUNTER SYMPTOMS
EYE REDNESS: 0
NAUSEA: 1
SHORTNESS OF BREATH: 0
VOMITING: 0
ABDOMINAL PAIN: 0

## 2021-10-23 ASSESSMENT — PAIN SCALES - GENERAL
PAINLEVEL_OUTOF10: 8
PAINLEVEL_OUTOF10: 0
PAINLEVEL_OUTOF10: 9

## 2021-10-23 ASSESSMENT — PAIN DESCRIPTION - PAIN TYPE: TYPE: ACUTE PAIN

## 2021-10-23 ASSESSMENT — PAIN DESCRIPTION - DESCRIPTORS: DESCRIPTORS: ACHING;DULL

## 2021-10-23 ASSESSMENT — PAIN DESCRIPTION - LOCATION: LOCATION: BACK

## 2021-10-23 ASSESSMENT — PAIN DESCRIPTION - FREQUENCY: FREQUENCY: CONTINUOUS

## 2021-10-23 NOTE — ED PROVIDER NOTES
Chief complaint: Flank pain      HPI:  10/23/21, Time: 12:02 PM EDT    HPI             Kenzie Mustafa is a 70 y.o. male presenting to the ED for flank pain. History is obtained from the patient as well as patient's medical record. The patient is presenting to the emergency department the chief complaint of right-sided flank pain. The pain has been present for 1 week. The pain is located in the right posterior flank and is nonradiating. Nothing makes it  better. Nothing makes it worse. The patient was evaluated by his primary care physician did have outpatient CT ordered and was prescribed a muscle relaxer and Mobic 1 pill daily. He states he has been taking his medications with no improvement. He did not have the CT scan at this point. The patient does admit to assisted nausea with no episodes of emesis. He denies any fevers, chest pain, shortness of breath, abdominal pain, dysuria, diarrhea or constipation. He has no history of kidney stones. He has no history of any aortic pathology. ROS:   Review of Systems   Constitutional: Negative for chills and fever. HENT: Negative for congestion. Eyes: Negative for redness. Respiratory: Negative for shortness of breath. Cardiovascular: Negative for chest pain. Gastrointestinal: Positive for nausea. Negative for abdominal pain and vomiting. Genitourinary: Positive for flank pain. Negative for dysuria. Musculoskeletal: Negative for arthralgias. Skin: Negative for rash. Neurological: Negative for light-headedness. Psychiatric/Behavioral: Negative for confusion.    All other systems reviewed and are negative.      --------------------------------------------- PAST HISTORY ---------------------------------------------  Past Medical History:  has a past medical history of Depression, Erectile dysfunction, GERD (gastroesophageal reflux disease), Glaucoma, Headache, Hemorrhoids, History of cardiovascular stress test, Hyperlipidemia, Hypogonadism male, Seizures ([facility] [address] and Sleep apnea. Past Surgical History:  has a past surgical history that includes Knee Arthroplasty (Left, 2013); Cardiac catheterization (2006); and joint replacement (Left, 12/2015). Social History:  reports that he quit smoking about 35 years ago. His smoking use included cigarettes. He has a 18.00 pack-year smoking history. He has quit using smokeless tobacco. He reports current alcohol use. He reports that he does not use drugs. Family History: family history is not on file. The patients home medications have been reviewed. Allergies: Patient has no known allergies. ---------------------------------------------------PHYSICAL EXAM--------------------------------------  Constitutional/General: Alert and oriented x3, well appearing, non toxic in NAD  Head: Normocephalic and atraumatic  Mouth: Oropharynx clear, handling secretions, no trismus  Neck: Supple, full ROM,  Pulmonary: Lungs clear to auscultation bilaterally, no wheezes, rales, or rhonchi. Not in respiratory distress  Cardiovascular:  Regular rate. Regular rhythm. No murmurs  Chest: no chest wall tenderness  Abdomen: Soft. Non tender. Non distended. No rebound, guarding, or rigidity. No pulsatile masses appreciated. No tenderness over right flank, no overlying rashes  Musculoskeletal: Moves all extremities x 4. Warm and well perfused, no clubbing, cyanosis, or edema. Capillary refill <3 seconds  Skin: warm and dry. No rashes. Neurologic: GCS 15, no gross focal neurologic deficits  Psych: Normal Affect    -------------------------------------------------- RESULTS -------------------------------------------------  I have personally reviewed all laboratory and imaging results for this patient. Results are listed below.      LABS:  Results for orders placed or performed during the hospital encounter of 10/23/21   CBC Auto Differential   Result Value Ref Range    WBC 7.0 4.5 - 11.5 E9/L    RBC 4.71 3.80 - 5.80 E12/L    Hemoglobin 13.7 12.5 - 16.5 g/dL    Hematocrit 39.3 37.0 - 54.0 %    MCV 83.4 80.0 - 99.9 fL    MCH 29.1 26.0 - 35.0 pg    MCHC 34.9 (H) 32.0 - 34.5 %    RDW 12.6 11.5 - 15.0 fL    Platelets 554 305 - 245 E9/L    MPV 8.7 7.0 - 12.0 fL    Neutrophils % 57.1 43.0 - 80.0 %    Immature Granulocytes % 0.3 0.0 - 5.0 %    Lymphocytes % 29.6 20.0 - 42.0 %    Monocytes % 9.3 2.0 - 12.0 %    Eosinophils % 3.0 0.0 - 6.0 %    Basophils % 0.7 0.0 - 2.0 %    Neutrophils Absolute 3.99 1.80 - 7.30 E9/L    Immature Granulocytes # 0.02 E9/L    Lymphocytes Absolute 2.07 1.50 - 4.00 E9/L    Monocytes Absolute 0.65 0.10 - 0.95 E9/L    Eosinophils Absolute 0.21 0.05 - 0.50 E9/L    Basophils Absolute 0.05 0.00 - 0.20 E9/L   Comprehensive Metabolic Panel w/ Reflex to MG   Result Value Ref Range    Sodium 137 132 - 146 mmol/L    Potassium reflex Magnesium 4.1 3.5 - 5.0 mmol/L    Chloride 103 98 - 107 mmol/L    CO2 23 22 - 29 mmol/L    Anion Gap 11 7 - 16 mmol/L    Glucose 85 74 - 99 mg/dL    BUN 16 6 - 23 mg/dL    CREATININE 0.9 0.7 - 1.2 mg/dL    GFR Non-African American >60 >=60 mL/min/1.73    GFR African American >60     Calcium 9.4 8.6 - 10.2 mg/dL    Total Protein 7.8 6.4 - 8.3 g/dL    Albumin 4.4 3.5 - 5.2 g/dL    Total Bilirubin 0.5 0.0 - 1.2 mg/dL    Alkaline Phosphatase 122 40 - 129 U/L    ALT 34 0 - 40 U/L    AST 43 (H) 0 - 39 U/L   Urinalysis   Result Value Ref Range    Color, UA Yellow Straw/Yellow    Clarity, UA Clear Clear    Glucose, Ur Negative Negative mg/dL    Bilirubin Urine Negative Negative    Ketones, Urine Negative Negative mg/dL    Specific Gravity, UA 1.015 1.005 - 1.030    Blood, Urine Negative Negative    pH, UA 7.5 5.0 - 9.0    Protein, UA Negative Negative mg/dL    Urobilinogen, Urine 0.2 <2.0 E.U./dL    Nitrite, Urine Negative Negative    Leukocyte Esterase, Urine Negative Negative       RADIOLOGY:  Interpreted by Radiologist.  CTA CHEST W CONTRAST   Final Result   1.  No thoracic aortic aneurysm or dissection. 2. No pneumonia or pleural effusion. 3. Noncalcified pulmonary nodule located in left lower lobe measures 4 cm on   axial image number 82. Follow-up recommended as indicated below. 4. Focal sclerotic lesion involving T2 vertebral body measures 7 mm which may   represent a bone island. 5. Diverticulosis. RECOMMENDATIONS:   Fleischner Society guidelines for follow-up and management of incidentally   detected pulmonary nodules:      Single Solid Nodule:      Nodule size less than 6 mm   In a low-risk patient, no routine follow-up. In a high-risk patient, optional CT at 12 months. Nodule size equals 6-8 mm   In a low-risk patient, CT at 6-12 months, then consider CT at 18-24 months. In a high-risk patient, CT at 6-12 months, then CT at 18-24 months. Nodule size greater than 8 mm         In a low-risk patient, consider CT at 3 months, PET/CT, or tissue sampling. In a high-risk patient, consider CT at 3 months, PET/CT, or tissue sampling. Multiple Solid Nodules:      Nodule size less than 6 mm   In a low-risk patient, no routine follow-up. In a high-risk patient, optional CT at 12 months. Nodule size equals 6-8 mm   In a low-risk patient, CT at 3-6 months, then consider CT at 18-24 months. In a high-risk patient, CT at 3-6 months, then CT at 18-24 months. Nodule size greater than 8 mm   In a low-risk patient, CT at 3-6 months, then consider CT at 18-24 months. In a high-risk patient, CT at 3-6 months, then CT at 18-24 months. - Low risk patients include individuals with minimal or absent history of   smoking and other known risk factors. - High risk patients include individuals with a history or smoking or known   risk factors. Radiology 2017 http://pubs. rsna.org/doi/full/10.1148/radiol. 3569371467         CTA ABDOMEN PELVIS W CONTRAST   Final Result   1. No thoracic aortic aneurysm or dissection. 2. No pneumonia or pleural effusion.    3. Noncalcified pulmonary nodule located in left lower lobe measures 4 cm on   axial image number 82. Follow-up recommended as indicated below. 4. Focal sclerotic lesion involving T2 vertebral body measures 7 mm which may   represent a bone island. 5. Diverticulosis. RECOMMENDATIONS:   Fleischner Society guidelines for follow-up and management of incidentally   detected pulmonary nodules:      Single Solid Nodule:      Nodule size less than 6 mm   In a low-risk patient, no routine follow-up. In a high-risk patient, optional CT at 12 months. Nodule size equals 6-8 mm   In a low-risk patient, CT at 6-12 months, then consider CT at 18-24 months. In a high-risk patient, CT at 6-12 months, then CT at 18-24 months. Nodule size greater than 8 mm         In a low-risk patient, consider CT at 3 months, PET/CT, or tissue sampling. In a high-risk patient, consider CT at 3 months, PET/CT, or tissue sampling. Multiple Solid Nodules:      Nodule size less than 6 mm   In a low-risk patient, no routine follow-up. In a high-risk patient, optional CT at 12 months. Nodule size equals 6-8 mm   In a low-risk patient, CT at 3-6 months, then consider CT at 18-24 months. In a high-risk patient, CT at 3-6 months, then CT at 18-24 months. Nodule size greater than 8 mm   In a low-risk patient, CT at 3-6 months, then consider CT at 18-24 months. In a high-risk patient, CT at 3-6 months, then CT at 18-24 months. - Low risk patients include individuals with minimal or absent history of   smoking and other known risk factors. - High risk patients include individuals with a history or smoking or known   risk factors. Radiology 2017 http://pubs. rsna.org/doi/full/10.1148/radiol. 6173872603         CT ABDOMEN PELVIS WO CONTRAST Additional Contrast? None   Final Result   1. No indication for renal calculus or obstructive uropathy in the right or   in the left kidneys.       2.  No indication for acute intraperitoneal retroperitoneal process in the   abdomen or pelvis.           ------------------------- NURSING NOTES AND VITALS REVIEWED ---------------------------   The nursing notes within the ED encounter and vital signs as below have been reviewed by myself. /78   Pulse 62   Temp 96.9 °F (36.1 °C) (Infrared)   Resp 18   SpO2 97%   Oxygen Saturation Interpretation: Normal    The patients available past medical records and past encounters were reviewed. ------------------------------ ED COURSE/MEDICAL DECISION MAKING----------------------  Medications   ketorolac (TORADOL) injection 15 mg (15 mg IntraVENous Given 10/23/21 1228)   fentaNYL (SUBLIMAZE) injection 75 mcg (75 mcg IntraVENous Given 10/23/21 1226)   ondansetron (ZOFRAN) injection 4 mg (4 mg IntraVENous Given 10/23/21 1226)   0.9 % sodium chloride bolus (0 mLs IntraVENous Stopped 10/23/21 1408)   iopamidol (ISOVUE-370) 76 % injection 75 mL (75 mLs IntraVENous Given 10/23/21 1518)             Medical Decision Making:   I, Dr. Yaquelin Monae am the primary physician of record. Chuy Montemayor is a 70 y.o. male who presents to the ED for pain. Differential diagnosis benign to muscle strain, ureteral stone, aortic aneurysm, aortic dissection, pancreatitis. The patient did have labs and imaging which were reviewed. CBC fairly unremarkable, CMP unremarkable, CTA of the chest abdomen pelvis with no acute process. Patient is educated on pulmonary nodule. Patient is instructed to keep taking his anti-inflammatory, muscle relaxer and will have Lidoderm patches added to his regimen. He will follow-up outpatient. Re-Evaluations/Consultations:             ED Course as of Oct 23 2018   Sat Oct 23, 2021   1340 Patient in bed no acute distress. He states that the pain has improved. Patient was educated on the findings of today to this point. The patient will have a CTA chest abdomen pelvis ordered.     [MT]   1550 Patient is in the bed no acute distress results of today were discussed. [MT]      ED Course User Index  [MT] Paulo Wells DO               This patient's ED c ourse included: History, physical examination, reevaluation prior to disposition, labs, imaging, IV medications    This patient has remained hemodynamically stable during their ED course. Counseling: The emergency provider has spoken with the patient and discussed todays results, in addition to providing specific details for the plan of care and counseling regarding the diagnosis and prognosis. Questions are answered at this time and they are agreeable with the plan.       --------------------------------- IMPRESSION AND DISPOSITION ---------------------------------    IMPRESSION  1. Right flank pain    2. Pulmonary nodule        DISPOSITION  Disposition: Discharge to home  Patient condition is stable        NOTE: This report was transcribed using voice recognition software.  Every effort was made to ensure accuracy; however, inadvertent computerized transcription errors may be present         Paulo Wells DO  10/23/21 2018

## 2021-11-03 RX ORDER — PANTOPRAZOLE SODIUM 40 MG/1
40 TABLET, DELAYED RELEASE ORAL
Qty: 90 TABLET | Refills: 1 | Status: SHIPPED | OUTPATIENT
Start: 2021-11-03 | End: 2022-05-31 | Stop reason: SDUPTHER

## 2021-11-04 ENCOUNTER — TELEPHONE (OUTPATIENT)
Dept: FAMILY MEDICINE CLINIC | Age: 71
End: 2021-11-04

## 2021-11-04 ENCOUNTER — OFFICE VISIT (OUTPATIENT)
Dept: FAMILY MEDICINE CLINIC | Age: 71
End: 2021-11-04
Payer: MEDICARE

## 2021-11-04 VITALS
SYSTOLIC BLOOD PRESSURE: 126 MMHG | DIASTOLIC BLOOD PRESSURE: 80 MMHG | WEIGHT: 228 LBS | HEART RATE: 85 BPM | OXYGEN SATURATION: 98 % | TEMPERATURE: 96.2 F | BODY MASS INDEX: 34.67 KG/M2

## 2021-11-04 DIAGNOSIS — Z23 NEED FOR IMMUNIZATION AGAINST INFLUENZA: ICD-10-CM

## 2021-11-04 DIAGNOSIS — I10 ESSENTIAL HYPERTENSION: ICD-10-CM

## 2021-11-04 DIAGNOSIS — F32.A DEPRESSION, UNSPECIFIED DEPRESSION TYPE: ICD-10-CM

## 2021-11-04 DIAGNOSIS — R91.1 LUNG NODULE: ICD-10-CM

## 2021-11-04 DIAGNOSIS — E78.5 HYPERLIPIDEMIA, UNSPECIFIED HYPERLIPIDEMIA TYPE: ICD-10-CM

## 2021-11-04 DIAGNOSIS — R74.8 ELEVATED LIVER ENZYMES: ICD-10-CM

## 2021-11-04 DIAGNOSIS — M54.50 LOW BACK PAIN WITHOUT SCIATICA, UNSPECIFIED BACK PAIN LATERALITY, UNSPECIFIED CHRONICITY: Primary | ICD-10-CM

## 2021-11-04 PROCEDURE — 1036F TOBACCO NON-USER: CPT | Performed by: INTERNAL MEDICINE

## 2021-11-04 PROCEDURE — G8484 FLU IMMUNIZE NO ADMIN: HCPCS | Performed by: INTERNAL MEDICINE

## 2021-11-04 PROCEDURE — G0008 ADMIN INFLUENZA VIRUS VAC: HCPCS | Performed by: INTERNAL MEDICINE

## 2021-11-04 PROCEDURE — 3017F COLORECTAL CA SCREEN DOC REV: CPT | Performed by: INTERNAL MEDICINE

## 2021-11-04 PROCEDURE — 90694 VACC AIIV4 NO PRSRV 0.5ML IM: CPT | Performed by: INTERNAL MEDICINE

## 2021-11-04 PROCEDURE — 4040F PNEUMOC VAC/ADMIN/RCVD: CPT | Performed by: INTERNAL MEDICINE

## 2021-11-04 PROCEDURE — 99213 OFFICE O/P EST LOW 20 MIN: CPT | Performed by: INTERNAL MEDICINE

## 2021-11-04 PROCEDURE — G8427 DOCREV CUR MEDS BY ELIG CLIN: HCPCS | Performed by: INTERNAL MEDICINE

## 2021-11-04 PROCEDURE — 1123F ACP DISCUSS/DSCN MKR DOCD: CPT | Performed by: INTERNAL MEDICINE

## 2021-11-04 PROCEDURE — G8417 CALC BMI ABV UP PARAM F/U: HCPCS | Performed by: INTERNAL MEDICINE

## 2021-11-04 NOTE — PROGRESS NOTES
Subjective:     Chief Complaint   Patient presents with    Other   Patient here for follow-up from the ER, he was seen here October 28, 2021 with back pain  He was given Mobic and Flexeril,  I had ordered a CT of the abdomen pelvis,  His pain was getting worse October 23, 2021 so he went to the ER, he had a CTA chest and CTA of the abdomen pelvis    1. No thoracic aortic aneurysm or dissection. 2. No pneumonia or pleural effusion. 3. Noncalcified pulmonary nodule located in left lower lobe measures 4 cm on   axial image number 82.  Follow-up recommended as indicated below. 4. Focal sclerotic lesion involving T2 vertebral body measures 7 mm which may   represent a bone island. 5. Diverticulosis. CT abdomen/pelvis:       No bowel obstruction, free air, or free fluid.  Multiple diverticula   associated with the sigmoid colon.  No evidence of acute diverticulitis.  The   appendix is not definitively visualized, however no focal inflammatory   changes in its expected location.  No intrahepatic or extrahepatic bile duct   dilatation.  Gallbladder is unremarkable.  No evidence of acute pancreatitis.    Spleen is nonenlarged.  Adrenal glands are normal.  No hydronephrosis.  Fat   containing left inguinal hernia measures up to 2.5      From the ER he was referred to pain clinic he did see Dr. Bo Haskins and got 1 epidural in the back which has helped so he is going to follow-up with him  His labs reviewed AST 43  CTA abdomen pelvis no abnormality on the liver, he had ultrasound of the abdomen June 2021 showed fatty liver    His back pain is doing better after the epidural      Past Medical History:   Diagnosis Date    Depression     Erectile dysfunction     GERD (gastroesophageal reflux disease)     Glaucoma     Headache     Hemorrhoids     History of cardiovascular stress test 12/06/2019    Stress ECHO    Hyperlipidemia     Hypogonadism male     Seizures ([facility] [address]     Sleep apnea         Social History Socioeconomic History    Marital status:      Spouse name: Not on file    Number of children: Not on file    Years of education: Not on file    Highest education level: Not on file   Occupational History    Not on file   Tobacco Use    Smoking status: Former Smoker     Packs/day: 1.00     Years: 18.00     Pack years: 18.00     Types: Cigarettes     Quit date: 9/3/1986     Years since quittin.1    Smokeless tobacco: Former User   Vaping Use    Vaping Use: Never used   Substance and Sexual Activity    Alcohol use: Yes     Comment: socially    Drug use: Never    Sexual activity: Yes     Partners: Female   Other Topics Concern    Not on file   Social History Narrative    Not on file     Social Determinants of Health     Financial Resource Strain:     Difficulty of Paying Living Expenses:    Food Insecurity: No Food Insecurity    Worried About Running Out of Food in the Last Year: Never true    Tobias of Food in the Last Year: Never true   Transportation Needs: No Transportation Needs    Lack of Transportation (Medical): No    Lack of Transportation (Non-Medical): No   Physical Activity:     Days of Exercise per Week:     Minutes of Exercise per Session:    Stress:     Feeling of Stress :    Social Connections:     Frequency of Communication with Friends and Family:     Frequency of Social Gatherings with Friends and Family:     Attends Zoroastrianism Services:     Active Member of Clubs or Organizations:     Attends Club or Organization Meetings:     Marital Status:    Intimate Partner Violence:     Fear of Current or Ex-Partner:     Emotionally Abused:     Physically Abused:     Sexually Abused:         Past Surgical History:   Procedure Laterality Date    CARDIAC CATHETERIZATION  2006    JOINT REPLACEMENT Left 2015    knee    KNEE ARTHROPLASTY Left         History reviewed. No pertinent family history.      No Known Allergies     ROS    CT abdomen/pelvis:       No needed for Muscle spasms 30 tablet 0    meloxicam (MOBIC) 15 MG tablet Take 1 tablet by mouth daily 30 tablet 0    simvastatin (ZOCOR) 20 MG tablet TAKE ONE TABLET BY MOUTH NIGHTLY 90 tablet 1    escitalopram (LEXAPRO) 10 MG tablet TAKE ONE TABLET BY MOUTH DAILY 90 tablet 0    valsartan (DIOVAN) 80 MG tablet TAKE ONE TABLET BY MOUTH DAILY 30 tablet 2    amLODIPine (NORVASC) 2.5 MG tablet Take 1 tablet by mouth daily 30 tablet 0    niacin 500 MG tablet Take 1 tablet by mouth daily 30 tablet 3    aspirin 81 MG EC tablet Take 81 mg by mouth daily       No current facility-administered medications for this visit.         Last 3 BMP  Lab Results   Component Value Date/Time     10/23/2021 12:09 PM     10/20/2021 02:00 PM     10/15/2021 07:59 AM    K 4.1 10/23/2021 12:09 PM    K 4.3 10/20/2021 02:00 PM    K 4.3 10/15/2021 07:59 AM    K 4.2 06/16/2021 07:58 AM    K 4.1 06/10/2021 04:39 PM     10/23/2021 12:09 PM    CL 99 10/20/2021 02:00 PM     10/15/2021 07:59 AM    CO2 23 10/23/2021 12:09 PM    CO2 21 (L) 10/20/2021 02:00 PM    CO2 19 (L) 10/15/2021 07:59 AM    BUN 16 10/23/2021 12:09 PM    BUN 15 10/20/2021 02:00 PM    BUN 16 10/15/2021 07:59 AM    CREATININE 0.9 10/23/2021 12:09 PM    CREATININE 0.9 10/20/2021 02:00 PM    CREATININE 1.1 10/15/2021 07:59 AM    GLUCOSE 85 10/23/2021 12:09 PM    GLUCOSE 84 10/20/2021 02:00 PM    GLUCOSE 127 (H) 10/15/2021 07:59 AM    CALCIUM 9.4 10/23/2021 12:09 PM    CALCIUM 9.6 10/20/2021 02:00 PM    CALCIUM 9.9 10/15/2021 07:59 AM       Last 3 CMP:    Lab Results   Component Value Date/Time     10/23/2021 12:09 PM     10/20/2021 02:00 PM     10/15/2021 07:59 AM    K 4.1 10/23/2021 12:09 PM    K 4.3 10/20/2021 02:00 PM    K 4.3 10/15/2021 07:59 AM    K 4.2 06/16/2021 07:58 AM    K 4.1 06/10/2021 04:39 PM     10/23/2021 12:09 PM    CL 99 10/20/2021 02:00 PM     10/15/2021 07:59 AM    CO2 23 10/23/2021 12:09 PM    CO2 21 (L) 10/20/2021 02:00 PM    CO2 19 (L) 10/15/2021 07:59 AM    BUN 16 10/23/2021 12:09 PM    BUN 15 10/20/2021 02:00 PM    BUN 16 10/15/2021 07:59 AM    CREATININE 0.9 10/23/2021 12:09 PM    CREATININE 0.9 10/20/2021 02:00 PM    CREATININE 1.1 10/15/2021 07:59 AM    GLUCOSE 85 10/23/2021 12:09 PM    GLUCOSE 84 10/20/2021 02:00 PM    GLUCOSE 127 (H) 10/15/2021 07:59 AM    CALCIUM 9.4 10/23/2021 12:09 PM    CALCIUM 9.6 10/20/2021 02:00 PM    CALCIUM 9.9 10/15/2021 07:59 AM    PROT 7.8 10/23/2021 12:09 PM    PROT 7.5 10/20/2021 02:00 PM    PROT 7.8 10/15/2021 07:59 AM    LABALBU 4.4 10/23/2021 12:09 PM    LABALBU 4.6 10/20/2021 02:00 PM    LABALBU 4.3 10/15/2021 07:59 AM    BILITOT 0.5 10/23/2021 12:09 PM    BILITOT 0.7 10/20/2021 02:00 PM    BILITOT 0.6 10/15/2021 07:59 AM    ALKPHOS 122 10/23/2021 12:09 PM    ALKPHOS 122 10/20/2021 02:00 PM    ALKPHOS 117 10/15/2021 07:59 AM    AST 43 (H) 10/23/2021 12:09 PM    AST 45 (H) 10/20/2021 02:00 PM    AST 35 10/15/2021 07:59 AM    ALT 34 10/23/2021 12:09 PM    ALT 35 10/20/2021 02:00 PM    ALT 26 10/15/2021 07:59 AM        CBC:   Lab Results   Component Value Date/Time    WBC 7.0 10/23/2021 12:09 PM    RBC 4.71 10/23/2021 12:09 PM    HGB 13.7 10/23/2021 12:09 PM    HCT 39.3 10/23/2021 12:09 PM    MCV 83.4 10/23/2021 12:09 PM    MCH 29.1 10/23/2021 12:09 PM    MCHC 34.9 (H) 10/23/2021 12:09 PM    RDW 12.6 10/23/2021 12:09 PM     10/23/2021 12:09 PM    MPV 8.7 10/23/2021 12:09 PM       A1C:  Lab Results   Component Value Date/Time    LABA1C 5.1 10/15/2021 07:59 AM       Lipid panel:  Lab Results   Component Value Date    CHOL 104 10/15/2021    CHOL 118 06/16/2021    CHOL 118 02/17/2021    TRIG 82 10/15/2021    TRIG 96 06/16/2021    TRIG 159 02/17/2021    HDL 30 10/15/2021    HDL 29 06/16/2021    HDL 30 02/17/2021        Lab Results   Component Value Date/Time    PROT 7.8 10/23/2021 12:09 PM    PROT 7.5 10/20/2021 02:00 PM    PROT 7.8 10/15/2021 07:59 AM       No results found for: MG      Assessment. Arsh Donahue was seen today for other. Diagnoses and all orders for this visit:    Low back pain without sciatica, unspecified back pain laterality, unspecified chronicity    Need for immunization against influenza  -     INFLUENZA, QUADV, ADJUVANTED, 72 YRS =, IM, PF, PREFILL SYR, 0.5ML (FLUAD)    Hyperlipidemia, unspecified hyperlipidemia type    Elevated liver enzymes  -     COMPREHENSIVE METABOLIC PANEL; Future  -     LIPID PANEL;  Future    Essential hypertension    Depression, unspecified depression type       Patient Active Problem List   Diagnosis    Depression    Hyperlipidemia    Obstructive sleep apnea syndrome    Essential hypertension       Plan: No back pain improving he got epidural which is helping    Hyperlipidemia continue Zocor 20 mg doing well    Elevated AST 43, fatty liver  Ultrasound liver no acute pathology  CTA abdomen pelvis no acute pathology in the liver    Hypertension controlled      Depression doing well with Lexapro 10 mg      Incidental finding of lung nodule on the CTA chest radiologist reports a 4 cm I think it should be 4 mm, I did call the radiology department the radiologist went to look at that and give me the corrected report, if it is 4 cm then he needs to see a pulmonologist  I told the patient to call me back in 1 week to get the final report    He declined to see any pulmonologist at this time    I am retiring in 2 months he will see a different physician next time  Prescription for labs given  He will follow-up on the lung nodule with the physician next time in 3 months    Addendum  I had called the radiologist to check on the CTA report of the chest and the abdomen they mentioned there was a left lower lobe lung nodule 4 cm,  They reviewed that report and made an       addendum     See their addendum report I discussed with the patient that the nodule is 4 mm not 4 cm, he will follow with the physician on the next visit and follow the recommendations    ADDENDUM:   Nodule described in the left lower lobe as mentioned in body of the report   and in the impression measures 4 mm, not 4 cm. Return in about 3 months (around 2/4/2022).        Marck Rivera MD  10:33 AM  11/4/2021     [place]

## 2021-11-04 NOTE — TELEPHONE ENCOUNTER
----- Message from [employer] sent at 11/4/2021 11:26 AM EDT -----  Subject: Appointment Request    Reason for Call: Routine (Patient Request) No Script    QUESTIONS  Type of Appointment? New Patient/New to Provider  Reason for appointment request? No appointments available during search  Additional Information for Provider? PT WOULD LIKE TO TALK TO DR DIXON,HE   IS A FRIEND OF HIS AND WOULD LIKE TO BE SEEN ,[address]   [phone]  ---------------------------------------------------------------------------  --------------  CALL BACK INFO  What is the best way for the office to contact you? OK to leave message on   voicemail  Preferred Call Back Phone Number? [phone]  ---------------------------------------------------------------------------  --------------  SCRIPT ANSWERS  Relationship to Patient? Self  Specialty Confirmation? Primary Care  (Is the patient requesting to see the provider for a procedure?)? No  (Is the patient requesting to see the provider urgently - today or   tomorrow. )? No  Have you been diagnosed with, awaiting test results for, or told that you   are suspected of having COVID-19 (Coronavirus)? (If patient has tested   negative or was tested as a requirement for work, school, or travel and   not based on symptoms, answer no)? No  Within the past two weeks have you developed any of the following symptoms   (answer no if symptoms have been present longer than 2 weeks or began   more than 2 weeks ago)? Fever or Chills, Cough, Shortness of breath or   difficulty breathing, Loss of taste or smell, Sore throat, Nasal   congestion, Sneezing or runny nose, Fatigue or generalized body aches   (answer no if pain is specific to a body part e.g. back pain), Diarrhea,   Headache? No  Have you had close contact with someone with COVID-19 in the last 14 days? No  (Service Expert - click yes below to proceed with [employer] As Usual   Scheduling)?  Yes

## 2021-12-01 ENCOUNTER — TELEPHONE (OUTPATIENT)
Dept: FAMILY MEDICINE CLINIC | Age: 71
End: 2021-12-01

## 2021-12-01 DIAGNOSIS — J40 BRONCHITIS: Primary | ICD-10-CM

## 2021-12-01 RX ORDER — DEXAMETHASONE 6 MG/1
6 TABLET ORAL DAILY
Qty: 6 TABLET | Refills: 0 | Status: SHIPPED | OUTPATIENT
Start: 2021-12-01 | End: 2021-12-07

## 2021-12-01 RX ORDER — AZITHROMYCIN 250 MG/1
250 TABLET, FILM COATED ORAL SEE ADMIN INSTRUCTIONS
Qty: 6 TABLET | Refills: 0 | Status: SHIPPED | OUTPATIENT
Start: 2021-12-01 | End: 2021-12-06

## 2021-12-01 RX ORDER — DEXTROMETHORPHAN HYDROBROMIDE AND PROMETHAZINE HYDROCHLORIDE 15; 6.25 MG/5ML; MG/5ML
5 SYRUP ORAL 4 TIMES DAILY PRN
Qty: 150 ML | Refills: 0 | Status: SHIPPED | OUTPATIENT
Start: 2021-12-01 | End: 2021-12-08

## 2021-12-01 NOTE — TELEPHONE ENCOUNTER
Patient called he tested covid positive on 11.28.21 at [facility] he has had both vaccines and booster. Asking Dr. Kassie Wolff to please call him.     Last seen 11/4/2021  Next appt Visit date not found      [phone]

## 2021-12-01 NOTE — PROGRESS NOTES
I spoke to patient he has been sick for 4 days, his sister had COVID-19,  He has cough, no fever, loss of taste, denies any dyspnea oxygen 97% room air, heart rate 102,  He did not want to go to urgent care or ER  Prescription for Zithromax, dexamethasone 6 mg, and Promethazine DM sent to the pharmacy,  I advised him if high fever, if shortness of breath, if any chest pain, if any chest pain with deep breathing, then go to the ER

## 2021-12-01 NOTE — TELEPHONE ENCOUNTER
I spoke to the patient, he declined to go to urgent care or ER, scription for Zithromax, Promethazine DM, dexamethasone, sent to the pharmacy  His oxygen was 97% room air and heart rate 102

## 2021-12-02 RX ORDER — VALSARTAN 80 MG/1
TABLET ORAL
Qty: 90 TABLET | Refills: 1 | Status: SHIPPED | OUTPATIENT
Start: 2021-12-02 | End: 2022-06-13 | Stop reason: SDUPTHER

## 2021-12-14 ENCOUNTER — TELEPHONE (OUTPATIENT)
Dept: FAMILY MEDICINE CLINIC | Age: 71
End: 2021-12-14

## 2021-12-14 NOTE — TELEPHONE ENCOUNTER
I spoke to patient he is feeling much better, oxygen 98%, appetite is very good, no fever, overall feels very well advised to monitor, if any new symptoms call me if any symptoms worse go to ER

## 2021-12-14 NOTE — TELEPHONE ENCOUNTER
Patient called requesting to speak w/Dr. Bessy Becker, stating he was previously diagnosed with COVID, finished RX prescribed by Dr. Bessy Becker, but still has cold-like symptoms and fatigue. Please advise.     Last seen 11/4/2021  Next appt 2/2/2022  [facility]

## 2021-12-23 RX ORDER — ESCITALOPRAM OXALATE 10 MG/1
TABLET ORAL
Qty: 90 TABLET | Refills: 1 | Status: SHIPPED | OUTPATIENT
Start: 2021-12-23 | End: 2022-07-05 | Stop reason: SDUPTHER

## 2022-01-27 DIAGNOSIS — R74.8 ELEVATED LIVER ENZYMES: ICD-10-CM

## 2022-01-27 LAB
ALBUMIN SERPL-MCNC: 4.4 G/DL (ref 3.5–5.2)
ALP BLD-CCNC: 90 U/L (ref 40–129)
ALT SERPL-CCNC: 25 U/L (ref 0–40)
ANION GAP SERPL CALCULATED.3IONS-SCNC: 16 MMOL/L (ref 7–16)
AST SERPL-CCNC: 32 U/L (ref 0–39)
BILIRUB SERPL-MCNC: 0.4 MG/DL (ref 0–1.2)
BUN BLDV-MCNC: 12 MG/DL (ref 6–23)
CALCIUM SERPL-MCNC: 9.9 MG/DL (ref 8.6–10.2)
CHLORIDE BLD-SCNC: 107 MMOL/L (ref 98–107)
CHOLESTEROL, TOTAL: 116 MG/DL (ref 0–199)
CO2: 21 MMOL/L (ref 22–29)
CREAT SERPL-MCNC: 0.9 MG/DL (ref 0.7–1.2)
GFR AFRICAN AMERICAN: >60
GFR NON-AFRICAN AMERICAN: >60 ML/MIN/1.73
GLUCOSE BLD-MCNC: 128 MG/DL (ref 74–99)
HDLC SERPL-MCNC: 36 MG/DL
LDL CHOLESTEROL CALCULATED: 64 MG/DL (ref 0–99)
POTASSIUM SERPL-SCNC: 4.1 MMOL/L (ref 3.5–5)
SODIUM BLD-SCNC: 144 MMOL/L (ref 132–146)
TOTAL PROTEIN: 7.5 G/DL (ref 6.4–8.3)
TRIGL SERPL-MCNC: 79 MG/DL (ref 0–149)
VLDLC SERPL CALC-MCNC: 16 MG/DL

## 2022-02-02 ENCOUNTER — OFFICE VISIT (OUTPATIENT)
Dept: FAMILY MEDICINE CLINIC | Age: 72
End: 2022-02-02
Payer: MEDICARE

## 2022-02-02 VITALS
SYSTOLIC BLOOD PRESSURE: 120 MMHG | RESPIRATION RATE: 14 BRPM | HEART RATE: 70 BPM | WEIGHT: 231 LBS | BODY MASS INDEX: 35.01 KG/M2 | TEMPERATURE: 97 F | DIASTOLIC BLOOD PRESSURE: 80 MMHG | OXYGEN SATURATION: 97 % | HEIGHT: 68 IN

## 2022-02-02 DIAGNOSIS — I10 ESSENTIAL HYPERTENSION: ICD-10-CM

## 2022-02-02 DIAGNOSIS — Z12.5 SCREENING PSA (PROSTATE SPECIFIC ANTIGEN): ICD-10-CM

## 2022-02-02 DIAGNOSIS — K21.9 GASTROESOPHAGEAL REFLUX DISEASE, UNSPECIFIED WHETHER ESOPHAGITIS PRESENT: ICD-10-CM

## 2022-02-02 DIAGNOSIS — E78.5 DYSLIPIDEMIA: ICD-10-CM

## 2022-02-02 DIAGNOSIS — R53.83 FATIGUE, UNSPECIFIED TYPE: ICD-10-CM

## 2022-02-02 DIAGNOSIS — Z76.89 ENCOUNTER TO ESTABLISH CARE: Primary | ICD-10-CM

## 2022-02-02 DIAGNOSIS — Z82.49 FAMILY HISTORY OF HEART DISEASE: ICD-10-CM

## 2022-02-02 DIAGNOSIS — R07.89 OTHER CHEST PAIN: ICD-10-CM

## 2022-02-02 DIAGNOSIS — F32.A MILD DEPRESSION: ICD-10-CM

## 2022-02-02 DIAGNOSIS — R73.09 ELEVATED GLUCOSE: ICD-10-CM

## 2022-02-02 DIAGNOSIS — G47.33 OSA (OBSTRUCTIVE SLEEP APNEA): ICD-10-CM

## 2022-02-02 PROCEDURE — G8484 FLU IMMUNIZE NO ADMIN: HCPCS | Performed by: FAMILY MEDICINE

## 2022-02-02 PROCEDURE — 1123F ACP DISCUSS/DSCN MKR DOCD: CPT | Performed by: FAMILY MEDICINE

## 2022-02-02 PROCEDURE — 3017F COLORECTAL CA SCREEN DOC REV: CPT | Performed by: FAMILY MEDICINE

## 2022-02-02 PROCEDURE — 96372 THER/PROPH/DIAG INJ SC/IM: CPT | Performed by: FAMILY MEDICINE

## 2022-02-02 PROCEDURE — 1036F TOBACCO NON-USER: CPT | Performed by: FAMILY MEDICINE

## 2022-02-02 PROCEDURE — G8417 CALC BMI ABV UP PARAM F/U: HCPCS | Performed by: FAMILY MEDICINE

## 2022-02-02 PROCEDURE — 4040F PNEUMOC VAC/ADMIN/RCVD: CPT | Performed by: FAMILY MEDICINE

## 2022-02-02 PROCEDURE — 99215 OFFICE O/P EST HI 40 MIN: CPT | Performed by: FAMILY MEDICINE

## 2022-02-02 PROCEDURE — G8427 DOCREV CUR MEDS BY ELIG CLIN: HCPCS | Performed by: FAMILY MEDICINE

## 2022-02-02 RX ORDER — CYANOCOBALAMIN 1000 UG/ML
1000 INJECTION INTRAMUSCULAR; SUBCUTANEOUS ONCE
Status: COMPLETED | OUTPATIENT
Start: 2022-02-02 | End: 2022-02-02

## 2022-02-02 RX ADMIN — CYANOCOBALAMIN 1000 MCG: 1000 INJECTION INTRAMUSCULAR; SUBCUTANEOUS at 10:40

## 2022-02-02 ASSESSMENT — ENCOUNTER SYMPTOMS
SHORTNESS OF BREATH: 0
DIARRHEA: 0
VOMITING: 0
SORE THROAT: 0
BACK PAIN: 0
SINUS PAIN: 0
RHINORRHEA: 0
COUGH: 0
NAUSEA: 0
CONSTIPATION: 0
ABDOMINAL PAIN: 0

## 2022-02-02 NOTE — PROGRESS NOTES
2022    Chief Complaint   Patient presents with    New Patient     NTP-previous pcp Dr Apoorva Joshua. Cardiology-Dr crooks. Silvio Amen- Dr María Cooper. Here to establish care. had recent labs drawn 2022       Melissa Sexton (:  1950) is a 70 y.o. male, here for establishing care. They report a PMH of:  Past Medical History:   Diagnosis Date    Depression     Erectile dysfunction     GERD (gastroesophageal reflux disease)     Glaucoma     Headache     Hemorrhoids     History of cardiovascular stress test 2019    Stress ECHO    Hyperlipidemia     Hypogonadism male     Seizures (HCC)     Sleep apnea        Social and family histories reviewed and updated as appropriate. He was previously a patient of Dr. Apoorva Joshua  He also follows with Cardiology (Dr. Velma Lima), GI (Dr. Marvin Miguel), podiatry (Dr. María Cooper)  He was recently evaluated by orthopedics [place])    F/U of chronic problem(s) and new or recent complaint of establish care, fatigue  Chronic problems reviewed today include:  HTN, HLD, depression, GERD, MERCY  Current status of this/these condition(s):  stable  Tolerating meds: Yes    Hypertension  Current treatment: Valsartan 80 mg daily, amlodipine 2.5 mg daily  Recent medication changes: started treatment last summer  20 lb weight gain    Antihypertensive medication side effects: no medication side effects noted.     Strong family history of heart disease  S/p catheterization x 2 (32 years and 24 years ago)    BP Readings from Last 3 Encounters:   22 120/80   21 126/80   10/23/21 124/78                                          Sodium (mmol/L)   Date Value   2022 144    BUN (mg/dL)   Date Value   2022 12    Glucose (mg/dL)   Date Value   2022 128 (H)      Potassium (mmol/L)   Date Value   2022 4.1     Potassium reflex Magnesium (mmol/L)   Date Value   10/23/2021 4.1    CREATININE (mg/dL)   Date Value   2022 0.9 Hyperlipidemia  Current treatment: Simvastatin 20 mg nightly  Recent medication changes: none    Lab Results   Component Value Date    CHOL 116 01/27/2022    TRIG 79 01/27/2022    HDL 36 01/27/2022    LDLCALC 64 01/27/2022     Lab Results   Component Value Date    ALT 25 01/27/2022    AST 32 01/27/2022        Depression  Current treatment: Lexapro 10 mg daily  Recent medication changes: none  Symptoms are well controlled    GERD  Current treatment: Pantoprazole 40 mg daily  Recent medication changes: none  He has previously undergone EGD    MERCY  Current treatment: oral appliance  Last sleep study 10-12 years ago  Complains of fatigue    Review of Systems   Constitutional: Positive for fatigue. Negative for chills and fever. HENT: Negative for congestion, rhinorrhea, sinus pain and sore throat. Respiratory: Negative for cough and shortness of breath. Cardiovascular: Negative for chest pain, palpitations and leg swelling. Gastrointestinal: Negative for abdominal pain, constipation, diarrhea, nausea and vomiting. Genitourinary: Negative for difficulty urinating. Musculoskeletal: Negative for arthralgias, back pain and gait problem. Skin: Negative for rash. Neurological: Negative for dizziness, weakness and numbness. Hematological: Does not bruise/bleed easily. Prior to Visit Medications    Medication Sig Taking?  Authorizing Provider   escitalopram (LEXAPRO) 10 MG tablet TAKE ONE TABLET BY MOUTH DAILY Yes Rocio Garcia MD   valsartan (DIOVAN) 80 MG tablet TAKE ONE TABLET BY MOUTH DAILY Yes Rocio Gacria MD   pantoprazole (PROTONIX) 40 MG tablet Take 1 tablet by mouth every morning (before breakfast) Yes Rocio Garcia MD   simvastatin (ZOCOR) 20 MG tablet TAKE ONE TABLET BY MOUTH NIGHTLY Yes Rocio Garcia MD   amLODIPine (NORVASC) 2.5 MG tablet Take 1 tablet by mouth daily Yes Natividad Robbins,    niacin 500 MG tablet Take 1 tablet by mouth daily Yes Jose Alberto Renee Osiel Randall MD   aspirin 81 MG EC tablet Take 81 mg by mouth daily Yes Historical Provider, MD        No Known Allergies    Past Surgical History:   Procedure Laterality Date    CARDIAC CATHETERIZATION  2006    JOINT REPLACEMENT Left 2015    knee    KNEE ARTHROPLASTY Left        Social History     Socioeconomic History    Marital status:      Spouse name: Not on file    Number of children: Not on file    Years of education: Not on file    Highest education level: Not on file   Occupational History    Not on file   Tobacco Use    Smoking status: Former Smoker     Packs/day: 1.00     Years: 18.00     Pack years: 18.00     Types: Cigarettes     Quit date: 9/3/1986     Years since quittin.4    Smokeless tobacco: Former User   Vaping Use    Vaping Use: Never used   Substance and Sexual Activity    Alcohol use: Yes     Comment: socially    Drug use: Never    Sexual activity: Yes     Partners: Female   Other Topics Concern    Not on file   Social History Narrative    Not on file     Social Determinants of Health     Financial Resource Strain:     Difficulty of Paying Living Expenses: Not on file   Food Insecurity: No Food Insecurity    Worried About [address] in the Last Year: Never true    [address] in the Last Year: Never true   Transportation Needs: No Transportation Needs    Lack of Transportation (Medical): No    Lack of Transportation (Non-Medical):  No   Physical Activity:     Days of Exercise per Week: Not on file    Minutes of Exercise per Session: Not on file   Stress:     Feeling of Stress : Not on file   Social Connections:     Frequency of Communication with Friends and Family: Not on file    Frequency of Social Gatherings with Friends and Family: Not on file    Attends Scientologist Services: Not on file    Active Member of Clubs or Organizations: Not on file    Attends Club or Organization Meetings: Not on file    Marital Status: Not on file Intimate Partner Violence:     Fear of Current or Ex-Partner: Not on file    Emotionally Abused: Not on file    Physically Abused: Not on file    Sexually Abused: Not on file   Housing Stability:     Unable to Pay for Housing in the Last Year: Not on file    Number of [place] in the Last Year: Not on file    Unstable Housing in the Last Year: Not on file        History reviewed. No pertinent family history. Vitals:    02/02/22 0934   BP: 120/80   Pulse: 70   Resp: 14   Temp: 97 °F (36.1 °C)   TempSrc: Temporal   SpO2: 97%   Weight: 231 lb (104.8 kg)   Height: 5' 8\" (1.727 m)     Estimated body mass index is 35.12 kg/m² as calculated from the following:    Height as of this encounter: 5' 8\" (1.727 m). Weight as of this encounter: 231 lb (104.8 kg). Physical Exam  Constitutional:       General: He is not in acute distress. Appearance: He is well-developed. He is obese. HENT:      Head: Normocephalic and atraumatic. Eyes:      Extraocular Movements: Extraocular movements intact. Conjunctiva/sclera: Conjunctivae normal.   Cardiovascular:      Rate and Rhythm: Normal rate and regular rhythm. Pulmonary:      Effort: Pulmonary effort is normal. No respiratory distress. Breath sounds: Normal breath sounds. No wheezing, rhonchi or rales. Abdominal:      General: There is no distension. Musculoskeletal:      Right lower leg: No edema. Left lower leg: No edema. Neurological:      General: No focal deficit present. Mental Status: He is alert. Mental status is at baseline. ASSESSMENT/PLAN:  Loren Leal was seen today for new patient. Diagnoses and all orders for this visit:    Encounter to establish care    Essential hypertension  -     Comprehensive Metabolic Panel; Future  -     CBC Auto Differential; Future  -     TSH; Future  -     NM Cardiac Stress Test Nuclear Imaging; Future    Dyslipidemia  -     LIPID PANEL;  Future  -     Comprehensive Metabolic Panel; Future  -     CBC Auto Differential; Future  -     TSH; Future    Mild depression (HCC)    Gastroesophageal reflux disease, unspecified whether esophagitis present    Lung nodule < 6cm on CT    MERCY (obstructive sleep apnea)  -     Baseline Diagnostic Sleep Study; Future    Fatigue, unspecified type  -     Comprehensive Metabolic Panel; Future  -     CBC Auto Differential; Future  -     TSH; Future  -     HEMOGLOBIN A1C; Future  -     VITAMIN B12 & FOLATE; Future  -     cyanocobalamin injection 1,000 mcg  -     NM Cardiac Stress Test Nuclear Imaging; Future  -     Cardiac Stress Test Exercise- Treadmill; Future    Elevated glucose  -     HEMOGLOBIN A1C; Future    Family history of heart disease  -     NM Cardiac Stress Test Nuclear Imaging; Future  -     Cardiac Stress Test Exercise- Treadmill; Future    Other chest pain   -     NM Cardiac Stress Test Nuclear Imaging; Future  -     Cardiac Stress Test Exercise- Treadmill; Future    Screening PSA (prostate specific antigen)  -     PSA SCREENING; Future      Additional plan and future considerations:   As above. EMR reviewed. Continue current medications. Update sleep study and refer for cardiac testing.   RTO in 6 months with labs drawn 1 week prior or sooner if needed    Future Appointments   Date Time Provider Krishna Weber   8/4/2022  9:00 AM Sim Khalil DO [address] DO on 2/2/2022 at 9:51 AM

## 2022-02-18 ENCOUNTER — TELEPHONE (OUTPATIENT)
Dept: FAMILY MEDICINE CLINIC | Age: 72
End: 2022-02-18

## 2022-02-18 ENCOUNTER — TELEPHONE (OUTPATIENT)
Dept: SLEEP CENTER | Age: 72
End: 2022-02-18

## 2022-02-18 NOTE — TELEPHONE ENCOUNTER
I spoke w/patient to cancel 8/4/22 appt due to Dr. Za Scales leaving [place] [address], patient stated he just had a stress test done at [facility] and wanted to discuss with Dr. Za Scales.  Informed that Dr. Za Scales and MA are not in the ofc on Friday, and will send msg.     Last seen 2/2/2022  Next appt Visit date not found

## 2022-03-12 ENCOUNTER — TELEPHONE (OUTPATIENT)
Dept: SLEEP CENTER | Age: 72
End: 2022-03-12

## 2022-03-14 ENCOUNTER — HOSPITAL ENCOUNTER (OUTPATIENT)
Dept: SLEEP CENTER | Age: 72
Discharge: HOME | End: 2022-03-14
Payer: MEDICARE

## 2022-03-14 DIAGNOSIS — G47.33 OSA (OBSTRUCTIVE SLEEP APNEA): Primary | ICD-10-CM

## 2022-03-14 PROCEDURE — 95811 POLYSOM 6/>YRS CPAP 4/> PARM: CPT

## 2022-03-18 NOTE — PROGRESS NOTES
[address] [place] [address]                               SLEEP STUDY REPORT    PATIENT NAME: Emmalene Najjar                     :        1950  MED REC NO:   [id number]                            ROOM:  ACCOUNT NO:   [de-identified]                           ADMIT DATE: 2022  PROVIDER:     Cheko Buckner MD    DATE OF STUDY:  2022    SPLIT-NIGHT POLYSOMNOGRAM REPORT    LOCATION:  [address] [place] [address] PROVIDER:  Myrna Hutchison DO    AGE: 70 yrs       SEX: Male          HEIGHT: 5 ft 8 in         WEIGHT: 226 lbs          BMI: 34.4 kg/m2    NECK CIRCUMFERENCE: 17 in    Symptoms: Excessive daytime sleepiness, snoring, napping, witnessed apneas, teeth grinding, difficulty falling back to sleep once awakened. The [place] Sleepiness Scale was 7 out of 24 (scores above or equal to 10 are suggestive of hypersomnolence). Indication: To reevaluate severity of obstructive sleep apnea and optimal PAP therapy settings. Medical History:   Obesity, hypertension, hyperlipidemia, GERD, and known history of obstructive sleep apnea currently treated with oral appliance therapy (last studied 10 to 12 years ago). Medications: Pantoprazole, valsartan, simvastatin, aspirin, citalopram, amlodipine, niacin. DESCRIPTION: This split night polysomnogram consisted of EEG, EOG, EMG and 2-lead ECG monitoring. Oronasal airflow (nasal pressure transducer, thermistor, and internal CPAP/bi-level PAP flow signal), chest and abdominal efforts by respiratory inductance plethysmography or polyvinylidene fluoride (PVDF) sensor, and pulse oximetry were monitored as well. Hypopneas were scored as at least a 30% reduction in amplitude of the semi-quantitative flow signal, associated with a 4% or greater oxygen desaturation.  Respiratory effort related arousals (RERAs) were scored as at least a 30% reduction in amplitude of the semi-quantitative flow signal, associated with an EEG microarousal. During this study, a titration of positive airway pressure was begun after a baseline/diagnostic recording time of 230 minutes that included  155 minutes of sleep. FINDINGS:  SLEEP CONTINUITY AND SLEEP ARCHITECTURE:  Lights were turned off at 10:04 PM and lights were turned on at 5:02 AM. Total recording time was 415 minutes and total sleep time was 294 minutes. Sleep onset latency was increased at 59 minutes and REM latency was increased to 260 minutes. The amount of N1 sleep was 14.5% of total sleep time, or 44 minutes. The amount of N2 sleep was 52% of total sleep time, or 152 minutes. The amount of REM sleep was 18.5% of total sleep time, or 54 minutes. Slow wave sleep was 15% of total sleep time, or 46 minutes. The pre-treatment microarousal index was increased at 38; arousals related respiratory events. The pre-treatment sleep efficiency was 67% and sleep efficiency after initiating positive airway pressure therapy was 75%. Sleep architecture improved with the use of positive airway pressure therapy, as compared to the pre-treatment baseline recording. RESPIRATORY MONITORING: Of note, the patient wore his home oral appliance for the entire study. The pre-treatment portion of the study documented 4 obstructive apneas, 1 central apnea, 0 mixed apneas, 32 hypopneas, and 66 respiratory effort related arousals (RERAs) during the total recorded sleep time. The pre-treatment apnea/hypopnea index (AHI) was 14.3. The pre-treatment respiratory disturbance index (RDI includes RERAs) was 40. There was no REM or supine sleep in the diagnostic portion of the study. The pre-treatment 4% oxygen desaturation index during sleep was 13.9. The lowest oxygen saturation during sleep was 88%, very briefly. Oxygen saturations were less than or equal to 88% for less than 1 minute of the pretreatment portion of the study.   Moderate snoring was noted.    EEG: No abnormal epileptiform activity was observed. ECG: The electrocardiogram documented normal sinus rhythm. The average heart rate during sleep was 64 beats per minute during the diagnostic portion of the study and 60 beats per minute during the treatment portion of the study. PERIODIC LIMB MOVEMENTS: Some periodic limb movements were noted in the titration portion of the study, and may have been consistent with \"CPAP kick\" phenomenon. EMG/VIDEO MONITORING: Loss of REM atonia, bruxism, and parasomnias were not observed. TREATMENT: After the diagnostic portion of the study, CPAP was initiated at 5 cm of water pressure and titrated to 11 cm of water in order to abolish respiratory events. Respiratory events were virtually eliminated with the use of positive airway pressure therapy. Titration at the optimal CPAP of 11 cm of water resulted in an AHI of 0, a minimum oxyhemoglobin saturation of 92%, and an average oxyhemoglobin saturation of 93%. Time spent on CPAP 11 cm of water included nonsupine REM sleep. Of the 185 minutes of positive airway pressure titration time the patient was awake for 44 minutes. IMPRESSION:  1. This study demonstrated mild obstructive sleep apnea. The severity of this patient's sleep disordered breathing was likely underestimated due to the absence of both REM and supine sleep in the diagnostic portion of study. In addition, the patient wore his home oral appliance during the study, likely mitigating some degree of sleep disordered breathing. 2.  A split-night study was performed, and the patient's sleep-disordered breathing was successfully treated with CPAP therapy. 3.  Subjectively, the patient reported sleeping better than usual and being willing to use PAP therapy at home on a chronic basis. RECOMMENDATIONS:    1. Auto-CPAP therapy at settings of 7-12 cm of water is recommended and should be ordered by the referring provider.   Equipment ordering information is below. In conjunction with CPAP therapy, the patient should be advised to maintain use of his current oral appliance. If he chooses to forego use of the oral appliance in the future, his pressure settings may need to be increased. It is unclear to what degree the oral appliance is improving his sleep disordered breathing. 2.  Per insurance requirements, the patient must be seen in clinical follow up within 3 months of receiving auto-CPAP therapy in order to document adherence to therapy, assess efficacy of the prescribed settings (as based upon a residual AHI of less than or equal to 5 on the device's remote download), and evaluate resolution of sleep-related complaints. 3.  The patient should be strongly counseled against driving while drowsy. 4.  Weight loss efforts should be encouraged, as the severity of obstructive sleep apnea may improve although no guarantee of cure can be made. 5.  The patient may be referred to the [facility] for ongoing management of his obstructive sleep apnea and PAP therapy, if the referring provider so desires. EQUIPMENT ORDERING INFORMATION:  DEVICE:  Auto CPAP with heated humidification and a remote modem. SETTINGS: 7-12 cm of water with EPR of 3. MASK TYPE: ResMed AirFit F20 fullface mask. MASK SIZE: Large. SUPPLEMENTAL OXYGEN:  None.         Delicia Young MD    D: 03/18/2022 14:59:19       T: 03/18/2022 15:44:38     [email or web address]  Job#: [id number]     Doc#: [id number]    CC:

## 2022-03-21 PROCEDURE — 95811 POLYSOM 6/>YRS CPAP 4/> PARM: CPT | Performed by: INTERNAL MEDICINE

## 2022-03-22 ENCOUNTER — TELEPHONE (OUTPATIENT)
Dept: FAMILY MEDICINE CLINIC | Age: 72
End: 2022-03-22

## 2022-03-22 DIAGNOSIS — G47.33 OSA (OBSTRUCTIVE SLEEP APNEA): Primary | ICD-10-CM

## 2022-03-22 NOTE — TELEPHONE ENCOUNTER
[address] called to inform patient's   insurance will not pay for the CPAP that was just ordered. States he will be eligible in 2023 for new machine.    Last seen 2/2/2022  Next appt Visit date not found  [facility]  [phone]

## 2022-03-29 RX ORDER — SIMVASTATIN 20 MG
TABLET ORAL
Qty: 90 TABLET | Refills: 0 | Status: SHIPPED
Start: 2022-03-29 | End: 2022-07-05 | Stop reason: SDUPTHER

## 2022-03-29 NOTE — TELEPHONE ENCOUNTER
Patient called for refill.     Last seen 2/2/2022  Next appt Visit date not found  [facility]

## 2022-05-09 DIAGNOSIS — E78.5 DYSLIPIDEMIA: ICD-10-CM

## 2022-05-09 DIAGNOSIS — R53.83 FATIGUE, UNSPECIFIED TYPE: ICD-10-CM

## 2022-05-09 DIAGNOSIS — Z12.5 SCREENING PSA (PROSTATE SPECIFIC ANTIGEN): ICD-10-CM

## 2022-05-09 DIAGNOSIS — R73.09 ELEVATED GLUCOSE: ICD-10-CM

## 2022-05-09 DIAGNOSIS — I10 ESSENTIAL HYPERTENSION: ICD-10-CM

## 2022-05-09 LAB
ALBUMIN SERPL-MCNC: 4.3 G/DL (ref 3.5–5.2)
ALP BLD-CCNC: 97 U/L (ref 40–129)
ALT SERPL-CCNC: 35 U/L (ref 0–40)
ANION GAP SERPL CALCULATED.3IONS-SCNC: 17 MMOL/L (ref 7–16)
AST SERPL-CCNC: 45 U/L (ref 0–39)
BASOPHILS ABSOLUTE: 0.06 E9/L (ref 0–0.2)
BASOPHILS RELATIVE PERCENT: 0.8 % (ref 0–2)
BILIRUB SERPL-MCNC: 0.6 MG/DL (ref 0–1.2)
BUN BLDV-MCNC: 15 MG/DL (ref 6–23)
CALCIUM SERPL-MCNC: 9.6 MG/DL (ref 8.6–10.2)
CHLORIDE BLD-SCNC: 105 MMOL/L (ref 98–107)
CHOLESTEROL, TOTAL: 131 MG/DL (ref 0–199)
CO2: 18 MMOL/L (ref 22–29)
CREAT SERPL-MCNC: 0.9 MG/DL (ref 0.7–1.2)
EOSINOPHILS ABSOLUTE: 0.2 E9/L (ref 0.05–0.5)
EOSINOPHILS RELATIVE PERCENT: 2.6 % (ref 0–6)
FOLATE: >20 NG/ML (ref 4.8–24.2)
GFR AFRICAN AMERICAN: >60
GFR NON-AFRICAN AMERICAN: >60 ML/MIN/1.73
GLUCOSE BLD-MCNC: 131 MG/DL (ref 74–99)
HBA1C MFR BLD: 5.4 % (ref 4–5.6)
HCT VFR BLD CALC: 44.2 % (ref 37–54)
HDLC SERPL-MCNC: 31 MG/DL
HEMOGLOBIN: 14.7 G/DL (ref 12.5–16.5)
IMMATURE GRANULOCYTES #: 0.04 E9/L
IMMATURE GRANULOCYTES %: 0.5 % (ref 0–5)
LDL CHOLESTEROL CALCULATED: 79 MG/DL (ref 0–99)
LYMPHOCYTES ABSOLUTE: 2.4 E9/L (ref 1.5–4)
LYMPHOCYTES RELATIVE PERCENT: 31.7 % (ref 20–42)
MCH RBC QN AUTO: 28.4 PG (ref 26–35)
MCHC RBC AUTO-ENTMCNC: 33.3 % (ref 32–34.5)
MCV RBC AUTO: 85.3 FL (ref 80–99.9)
MONOCYTES ABSOLUTE: 0.51 E9/L (ref 0.1–0.95)
MONOCYTES RELATIVE PERCENT: 6.7 % (ref 2–12)
NEUTROPHILS ABSOLUTE: 4.36 E9/L (ref 1.8–7.3)
NEUTROPHILS RELATIVE PERCENT: 57.7 % (ref 43–80)
PDW BLD-RTO: 13.3 FL (ref 11.5–15)
PLATELET # BLD: 224 E9/L (ref 130–450)
PMV BLD AUTO: 9.5 FL (ref 7–12)
POTASSIUM SERPL-SCNC: 4.1 MMOL/L (ref 3.5–5)
PROSTATE SPECIFIC ANTIGEN: 0.78 NG/ML (ref 0–4)
RBC # BLD: 5.18 E12/L (ref 3.8–5.8)
SODIUM BLD-SCNC: 140 MMOL/L (ref 132–146)
TOTAL PROTEIN: 7.6 G/DL (ref 6.4–8.3)
TRIGL SERPL-MCNC: 105 MG/DL (ref 0–149)
TSH SERPL DL<=0.05 MIU/L-ACNC: 1.12 UIU/ML (ref 0.27–4.2)
VITAMIN B-12: 392 PG/ML (ref 211–946)
VLDLC SERPL CALC-MCNC: 21 MG/DL
WBC # BLD: 7.6 E9/L (ref 4.5–11.5)

## 2022-05-13 ENCOUNTER — TELEPHONE (OUTPATIENT)
Dept: SLEEP CENTER | Age: 72
End: 2022-05-13

## 2022-05-13 NOTE — TELEPHONE ENCOUNTER
Pt transferred to office from pre service. He wanted an appt sooner than first available with physician did not want to see NP. Pt needs new cpap does not want to wait until [facility] send him a new one d/t recall. Pt has registered his cpap with [facility] Explained to pt machine needs to be over 5 yrs old d/t insurance requirements and offered an appt.  Pt did not to schedule an appt at this time

## 2022-06-30 ENCOUNTER — OFFICE VISIT (OUTPATIENT)
Dept: SLEEP CENTER | Age: 72
End: 2022-06-30
Payer: MEDICARE

## 2022-06-30 VITALS
OXYGEN SATURATION: 98 % | SYSTOLIC BLOOD PRESSURE: 115 MMHG | DIASTOLIC BLOOD PRESSURE: 75 MMHG | WEIGHT: 236 LBS | HEIGHT: 68 IN | RESPIRATION RATE: 12 BRPM | BODY MASS INDEX: 35.77 KG/M2 | HEART RATE: 71 BPM

## 2022-06-30 DIAGNOSIS — G47.33 OBSTRUCTIVE SLEEP APNEA: Primary | ICD-10-CM

## 2022-06-30 DIAGNOSIS — E66.9 OBESITY, UNSPECIFIED CLASSIFICATION, UNSPECIFIED OBESITY TYPE, UNSPECIFIED WHETHER SERIOUS COMORBIDITY PRESENT: ICD-10-CM

## 2022-06-30 DIAGNOSIS — G47.00 INSOMNIA, UNSPECIFIED TYPE: ICD-10-CM

## 2022-06-30 PROCEDURE — G8417 CALC BMI ABV UP PARAM F/U: HCPCS | Performed by: NURSE PRACTITIONER

## 2022-06-30 PROCEDURE — 1036F TOBACCO NON-USER: CPT | Performed by: NURSE PRACTITIONER

## 2022-06-30 PROCEDURE — 3017F COLORECTAL CA SCREEN DOC REV: CPT | Performed by: NURSE PRACTITIONER

## 2022-06-30 PROCEDURE — 99204 OFFICE O/P NEW MOD 45 MIN: CPT | Performed by: NURSE PRACTITIONER

## 2022-06-30 PROCEDURE — G8427 DOCREV CUR MEDS BY ELIG CLIN: HCPCS | Performed by: NURSE PRACTITIONER

## 2022-06-30 PROCEDURE — 1123F ACP DISCUSS/DSCN MKR DOCD: CPT | Performed by: NURSE PRACTITIONER

## 2022-06-30 RX ORDER — LATANOPROST 50 UG/ML
SOLUTION/ DROPS OPHTHALMIC
COMMUNITY
Start: 2022-05-31

## 2022-06-30 ASSESSMENT — SLEEP AND FATIGUE QUESTIONNAIRES
HOW LIKELY ARE YOU TO NOD OFF OR FALL ASLEEP WHILE WATCHING TV: 1
HOW LIKELY ARE YOU TO NOD OFF OR FALL ASLEEP WHILE SITTING AND READING: 2
HOW LIKELY ARE YOU TO NOD OFF OR FALL ASLEEP WHILE SITTING INACTIVE IN A PUBLIC PLACE: 0
HOW LIKELY ARE YOU TO NOD OFF OR FALL ASLEEP WHILE LYING DOWN TO REST IN THE AFTERNOON WHEN CIRCUMSTANCES PERMIT: 3
HOW LIKELY ARE YOU TO NOD OFF OR FALL ASLEEP IN A CAR, WHILE STOPPED FOR A FEW MINUTES IN TRAFFIC: 0
HOW LIKELY ARE YOU TO NOD OFF OR FALL ASLEEP WHILE SITTING AND TALKING TO SOMEONE: 0
HOW LIKELY ARE YOU TO NOD OFF OR FALL ASLEEP WHEN YOU ARE A PASSENGER IN A CAR FOR AN HOUR WITHOUT A BREAK: 0
ESS TOTAL SCORE: 6
HOW LIKELY ARE YOU TO NOD OFF OR FALL ASLEEP WHILE SITTING QUIETLY AFTER LUNCH WITHOUT ALCOHOL: 0

## 2022-06-30 NOTE — PROGRESS NOTES
[facility] Sleep Medicine    Patient Name: Ascencion Almaraz  Age: 67 y.o.   : 1950    Date of Visit: 22    HPI   Ascencion Almaraz is a 67 y.o. male with  has a past medical history of Depression, Erectile dysfunction, GERD (gastroesophageal reflux disease), Glaucoma, Headache, Hemorrhoids, History of cardiovascular stress test (2019), Hyperlipidemia, Hypogonadism male, Seizures ([address] and Sleep apnea. He presents as a new patient to Sleep Clinic, referred by Dr. Sanna Mixon, for Sleep Apnea   . Patient presents to establish with sleep medicine regarding management of his MERCY that was diagnosed around 10 years ago. His current CPAP device is his second device since being diagnosed and he believes it is around 11years old. Patient stopped using his CPAP about a year ago since the recall with [employer] was announced. His device is a Dream Station, however, he was not using an ozone . Patient notes the adverse effects of stopping CPAP such as daytime sleepiness, snoring, nocturnal apneas, and many nocturnal awakenings. He notes an improvement with his symptoms when he was regularly using CPAP. He believes his CPAP setting was 7 cm H2O. [employer] was previously servicing patient. Device is registered with [employer]. For the past year, patient has been taking ZzzQuil, and Tylenol PM periodically to help him stay asleep. He does not have difficulty initiating sleep but will wake up several times through the night for unknown reason. Patient had an overall device made in the interim. When he stopped using CPAP. Baseline diagnostic study records not available, so it is difficult to determine effectiveness of oral appliance. It was used during recent sleep study in March that showed a mild to moderate level of MERCY. Patient is here seeking new PAP device, given age of his current device as well as it being involved in the recall.   He would also like to switch to a more local DME company. DME: [facility]      Sleep History    Bed time: 10 pm  -11-12 midnight    Wake time: 6:30 -7 am    Sleep Latency (min):  Less than 30  Sleep Medications: Zzquil, ativan, tylenol PM  Awakenings:   Staying asleep is the problem  / bathroom  / falls back asleep quickly   Daytime Naps: Tries, but he has a difficult time sleeping the day. Sleep disturbances: None  Occupation: Retired    SLEEP HYGIENE     Activities before bed: TV in bed  Caffeine:     Coffee    0   Soda    3 -- 6 pm  Tea  Alcohol: rare  Tobacco: N     Sleep ROS     Snoring: Y-Without CPAP   Witnessed apneas: Y-without CPAP  AM Headache: N  Dry Mouth: N  Daytime Sleepiness: Y  Difficulty remembering things: N  MVA  or near miss accident due to sleepiness in the past? N  Tonsillectomy? N  Have you lost or gained weight recently? Gained a few pounds       Sleep Study History: 3/14/2022-PSG @[facility] [place]-weight 226 pounds, pretreatment sleep efficiency 67%, sleep efficiency after initiating PAP therapy 75%, pretreatment AHI 14.3-of note patient wore home oral appliance for entire study-no REM or supine sleep achieved in diagnostic portion of study, SPO2 isiah 88%,T<88% less than 1 minute---> titration using CPAP of 11 cm of water resulted in an AHI of 0, minimum O2 sat of 92% and average O2 sat of 93%    Past Medical History:  Past Medical History:   Diagnosis Date    Depression     Erectile dysfunction     GERD (gastroesophageal reflux disease)     Glaucoma     Headache     Hemorrhoids     History of cardiovascular stress test 12/06/2019    Stress ECHO    Hyperlipidemia     Hypogonadism male     Seizures ([address]     Sleep apnea        Past Surgical History:        Procedure Laterality Date    CARDIAC CATHETERIZATION  2006    KNEE ARTHROPLASTY Right 2020    TOTAL KNEE ARTHROPLASTY Left 2015       Allergies:  has No Known Allergies.   Social History:    Social History     Tobacco Use    Smoking status: Former Smoker     Packs/day: 1.00     Years: 18.00     Pack years: 18.00     Types: Cigarettes     Quit date: 9/3/1986     Years since quittin.8    Smokeless tobacco: Former User   Vaping Use    Vaping Use: Never used   Substance Use Topics    Alcohol use: Yes     Comment: socially    Drug use: Never        Family History:   History reviewed. No pertinent family history. Current Medications:    Current Outpatient Medications:     latanoprost (XALATAN) 0.005 % ophthalmic solution, INSTILL ONE DROP INTO THE RIGHT EYE AT BEDTIME., Disp: , Rfl:     amLODIPine (NORVASC) 2.5 MG tablet, Take 1 tablet by mouth daily, Disp: 30 tablet, Rfl: 0    valsartan (DIOVAN) 80 mg tablet, TAKE ONE TABLET BY MOUTH DAILY, Disp: 90 tablet, Rfl: 1    pantoprazole (PROTONIX) 40 MG tablet, Take 1 tablet by mouth every morning (before breakfast), Disp: 90 tablet, Rfl: 1    simvastatin (ZOCOR) 20 MG tablet, TAKE ONE TABLET BY MOUTH NIGHTLY, Disp: 90 tablet, Rfl: 0    escitalopram (LEXAPRO) 10 MG tablet, TAKE ONE TABLET BY MOUTH DAILY, Disp: 90 tablet, Rfl: 1    niacin 500 MG tablet, Take 1 tablet by mouth daily, Disp: 30 tablet, Rfl: 3    aspirin 81 MG EC tablet, Take 81 mg by mouth daily, Disp: , Rfl:     Sleep Medicine 2022   Sitting and reading 2   Watching TV 1   Sitting, inactive in a public place (e.g. a theatre or a meeting) 0   As a passenger in a car for an hour without a break 0   Lying down to rest in the afternoon when circumstances permit 3   Sitting and talking to someone 0   Sitting quietly after a lunch without alcohol 0   In a car, while stopped for a few minutes in traffic 0   Total score 6       Review of Systems:    Constitutional: no chills, no fever   Eyes: no blurred vision   Cardiovascular: no chest pain,   Respiratory: no cough, no shortness of breath   Gastrointestinal:  no nausea,  no vomiting, no diarrhea.    Neurological: no dizziness,  no headache, no memory changes  Endocrine: No chills        Objective:   PHYSICAL EXAM:    /75 (Site: Left Upper Arm, Position: Sitting, Cuff Size: Large Adult)   Pulse 71   Resp 12   Ht 5' 8\" (1.727 m)   Wt 236 lb (107 kg)   SpO2 98%   BMI 35.88 kg/m²     Physical exam:  Gen: No acute distress. BMI of Body mass index is 35.88 kg/m². ENT: No nasal/oral discharge. Pharynx clear. Mallampati class- 3  Tonsils- 1  Neck: Trachea midline. No obvious mass. Neck circumference     Resp: No accessory muscle use. No crackles. No wheezes. No rhonchi. CV: Regular rate. Regular rhythm. No murmur or rub. Skin: Warm and dry. No bleeding observed   M/S: No cyanosis. No obvious joint deformity. Neuro: Awake. Alert. Moves all four extremities. Psych: Alert and oriented. No anxiety. CPAP Compliance Download   No data download available today. Reported compliance: No usage over the past year. Patient stopped using device once he learned of the recall. Assessment:      [place] was seen today for sleep apnea. Diagnoses and all orders for this visit:    Obstructive sleep apnea  -     DME Order for CPAP as OP  -     DME Order for CPAP as OP  -     DME Order for CPAP as OP    Insomnia, unspecified type    Obesity, unspecified classification, unspecified obesity type, unspecified whether serious comorbidity present    ·    Plan:     Sayda Stevens is a 67 y.o. male that  has a past medical history of Depression, Erectile dysfunction, GERD (gastroesophageal reflux disease), Glaucoma, Headache, Hemorrhoids, History of cardiovascular stress test (12/06/2019), Hyperlipidemia, Hypogonadism male, Seizures ([facility] [address] and Sleep apnea. He presents as a new patient to Sleep Clinic for Sleep Apnea   . Patient's current CPAP device is 11years old and involved with the [employer] recall. He has not used it in about a year since learning of the recall. He denies use of ozone  as well as new onset respiratory symptoms.   Patient notes increasing sleep apnea symptoms since he has not used CPAP including maintenance insomnia. In the interim, patient had oral appliance, but it is unclear to what degree it is improving his sleep disordered breathing. 1. Obstructive Sleep Apnea    -Reviewed sleep study results with patient in detail.  -Given age of his current CPAP, will attempt to order new CPAP device. Auto CPAP ordered with pressure of 7-12 cm H2O. Will connect patient with more local DME. If insurance denies new sleep, patient is aware that he will need to take his current device to DME in order to link the [facility] to [facility]. If he cannot receive the new CPAP, patient is open to restart using his current CPAP device. Will change settings from 7 to 11 cm of water based on recent split-night sleep study results.  -Will place order supplies and set patient up with new DME.  -Discussed pathophysiology of MERCY and its impact on daily well-being, as well as cardiometabolic and neurocognitive health (particularly in moderate-severe cases). -Patient understands that CPAP should be worn every night for the duration of the night (in order to not miss therapy during early-morning REM period) for maximum benefit. -Reviewed information regarding [employer] recall. Patient denies use of ozone  or new onset respiratory symptoms. He did register his device. 2.  Chronic Sleep Maintenance Insomnia     -Will assess for improvement with PAP therapy and consider Cognitive Behavioral Therapy for Insomnia if persistent. 3. Obesity. Body mass index is 35.88 kg/m².     -Healthy weight loss advised  -Discussed impact of weight gain on MERCY severity. Patient understands that MERCY severity may improve with weight loss but no guarantee of cure can be made. Follow up: Return in about 3 months (around 9/30/2022) for CPAP compliance, Follow up for sleep apnea.     LUPILLO Enriquez-CNP  Sleep Medicine   [place] [phone] option 2  F- [phone]

## 2022-07-11 ENCOUNTER — TELEPHONE (OUTPATIENT)
Dept: SLEEP CENTER | Age: 72
End: 2022-07-11

## 2022-07-11 NOTE — TELEPHONE ENCOUNTER
Call to pt to inform him that it is too early to receive a new cpap he received his current device 2-12-18.  He will be taking his device to [facility] to have it linked

## 2022-08-15 PROBLEM — K21.9 GASTROESOPHAGEAL REFLUX DISEASE: Status: ACTIVE | Noted: 2022-08-15

## 2022-10-20 ENCOUNTER — OFFICE VISIT (OUTPATIENT)
Dept: SLEEP CENTER | Age: 72
End: 2022-10-20
Payer: MEDICARE

## 2022-10-20 VITALS
RESPIRATION RATE: 16 BRPM | HEIGHT: 68 IN | WEIGHT: 240.1 LBS | OXYGEN SATURATION: 95 % | SYSTOLIC BLOOD PRESSURE: 138 MMHG | DIASTOLIC BLOOD PRESSURE: 75 MMHG | BODY MASS INDEX: 36.39 KG/M2 | HEART RATE: 70 BPM

## 2022-10-20 DIAGNOSIS — G47.33 OBSTRUCTIVE SLEEP APNEA: Primary | ICD-10-CM

## 2022-10-20 DIAGNOSIS — G47.00 INSOMNIA, UNSPECIFIED TYPE: ICD-10-CM

## 2022-10-20 DIAGNOSIS — E66.9 OBESITY, UNSPECIFIED CLASSIFICATION, UNSPECIFIED OBESITY TYPE, UNSPECIFIED WHETHER SERIOUS COMORBIDITY PRESENT: ICD-10-CM

## 2022-10-20 PROCEDURE — G8427 DOCREV CUR MEDS BY ELIG CLIN: HCPCS | Performed by: NURSE PRACTITIONER

## 2022-10-20 PROCEDURE — G8417 CALC BMI ABV UP PARAM F/U: HCPCS | Performed by: NURSE PRACTITIONER

## 2022-10-20 PROCEDURE — 99213 OFFICE O/P EST LOW 20 MIN: CPT | Performed by: NURSE PRACTITIONER

## 2022-10-20 PROCEDURE — 3017F COLORECTAL CA SCREEN DOC REV: CPT | Performed by: NURSE PRACTITIONER

## 2022-10-20 PROCEDURE — G8484 FLU IMMUNIZE NO ADMIN: HCPCS | Performed by: NURSE PRACTITIONER

## 2022-10-20 PROCEDURE — 1036F TOBACCO NON-USER: CPT | Performed by: NURSE PRACTITIONER

## 2022-10-20 PROCEDURE — 1123F ACP DISCUSS/DSCN MKR DOCD: CPT | Performed by: NURSE PRACTITIONER

## 2022-10-20 ASSESSMENT — SLEEP AND FATIGUE QUESTIONNAIRES
HOW LIKELY ARE YOU TO NOD OFF OR FALL ASLEEP WHILE SITTING AND TALKING TO SOMEONE: 0
HOW LIKELY ARE YOU TO NOD OFF OR FALL ASLEEP WHILE LYING DOWN TO REST IN THE AFTERNOON WHEN CIRCUMSTANCES PERMIT: 3
HOW LIKELY ARE YOU TO NOD OFF OR FALL ASLEEP IN A CAR, WHILE STOPPED FOR A FEW MINUTES IN TRAFFIC: 0
HOW LIKELY ARE YOU TO NOD OFF OR FALL ASLEEP WHILE SITTING INACTIVE IN A PUBLIC PLACE: 0
ESS TOTAL SCORE: 6
HOW LIKELY ARE YOU TO NOD OFF OR FALL ASLEEP WHEN YOU ARE A PASSENGER IN A CAR FOR AN HOUR WITHOUT A BREAK: 0
HOW LIKELY ARE YOU TO NOD OFF OR FALL ASLEEP WHILE SITTING AND READING: 1
HOW LIKELY ARE YOU TO NOD OFF OR FALL ASLEEP WHILE SITTING QUIETLY AFTER LUNCH WITHOUT ALCOHOL: 1
HOW LIKELY ARE YOU TO NOD OFF OR FALL ASLEEP WHILE WATCHING TV: 1

## 2022-10-20 NOTE — PROGRESS NOTES
[facility] Sleep Medicine    Patient Name: Jerson Levin  Age: 67 y.o.   : 1950    Date of Visit: 10/20/22        Review of Last Visit Summary:    The patient was last seen on 2022 for  Obstructive Sleep Apnea. Interim History:     Jerson Levin is a 67 y.o. male that  has a past medical history of Depression, Erectile dysfunction, GERD (gastroesophageal reflux disease), Glaucoma, Headache, Hemorrhoids, History of cardiovascular stress test (2019), Hyperlipidemia, Hypogonadism male, Seizures ([address] and Sleep apnea. He presents with his wife in follow up to Sleep Clinic to review CPAP adherence and efficacy. Interval Events:    -Last visit he believed he was due for new CPAP, found out patient is not eligible for new CPAP device until 2023.  -In the meantime, patient did receive new [employer] DreamStation to CPAP. -Currently using CPAP nightly with benefit although he does not like how the humidifier chamber has to come off of the device to be filled. -Using a fullface mask, no significant leak. -Was set up with local DME, [facility]  for CPAP supplies.  -Motivated to continue CPAP use, insomnia symptoms have resolved with regular PAP use. DME: [facility]    Sleep Study History: 3/14/2022-PSG @[facility] [place]-weight 226 pounds, pretreatment sleep efficiency 67%, sleep efficiency after initiating PAP therapy 75%, pretreatment AHI 14.3-of note patient wore home oral appliance for entire study-no REM or supine sleep achieved in diagnostic portion of study, SPO2 isiah 88%,T<88% less than 1 minute---> titration using CPAP of 11 cm of water resulted in an AHI of 0, minimum O2 sat of 92% and average O2 sat of 93%    Sleep History:  Patient notes the adverse effects of stopping CPAP such as daytime sleepiness, snoring, nocturnal apneas, and many nocturnal awakenings. He notes an improvement with his symptoms when he was regularly using CPAP.   He believes his CPAP setting was 7 cm H2O. [employer] was previously servicing patient. Device is registered with "[employer] For the past year, patient has been taking ZzzQuil, and Tylenol PM periodically to help him stay asleep. He does not have difficulty initiating sleep but will wake up several times through the night for unknown reason. Patient had an overall device made in the interim. When he stopped using CPAP. Baseline diagnostic study records not available, so it is difficult to determine effectiveness of oral appliance. It was used during recent sleep study in March that showed a mild to moderate level of MERCY. Patient is here seeking new PAP device, given age of his current device as well as it being involved in the recall. He would also like to switch to a more local [employer] company. Sleep History     Bed time: 10 pm  -11-12 midnight    Wake time: 6:30 -7 am    Sleep Latency (min):  Less than 30  Sleep Medications: Zzquil, ativan, tylenol PM  Awakenings:   Staying asleep is the problem  / bathroom  / falls back asleep quickly   Daytime Naps: Tries, but he has a difficult time sleeping the day. Sleep disturbances: None  Occupation: Retired     SLEEP HYGIENE     Activities before bed: TV in bed  Caffeine:     Coffee    0   Soda    3 -- 6 pm  Tea  Alcohol: rare  Tobacco: N     Sleep ROS      Snoring: Y-Without CPAP   Witnessed apneas: Y-without CPAP  AM Headache: N  Dry Mouth: N  Daytime Sleepiness: Y  Difficulty remembering things: N  MVA  or near miss accident due to sleepiness in the past? N  Tonsillectomy? N  Have you lost or gained weight recently?  Gained a few pounds      Past Medical History:  Past Medical History:   Diagnosis Date    Depression     Erectile dysfunction     GERD (gastroesophageal reflux disease)     Glaucoma     Headache     Hemorrhoids     History of cardiovascular stress test 12/06/2019    Stress ECHO    Hyperlipidemia     Hypogonadism male     Seizures ([facility] [address]     Sleep apnea        Past Surgical History:        Procedure Laterality Date    CARDIAC CATHETERIZATION  2006    KNEE ARTHROPLASTY Right 2020    TOTAL KNEE ARTHROPLASTY Left 2015       Allergies:  has No Known Allergies. Social History:    Social History     Tobacco Use    Smoking status: Former     Packs/day: 1.00     Years: 18.00     Pack years: 18.00     Types: Cigarettes     Quit date: 9/3/1986     Years since quittin.1    Smokeless tobacco: Former   Vaping Use    Vaping Use: Never used   Substance Use Topics    Alcohol use: Yes     Comment: socially    Drug use: Never        Family History:   History reviewed. No pertinent family history.     Current Medications:    Current Outpatient Medications:     simvastatin (ZOCOR) 20 MG tablet, TAKE ONE TABLET BY MOUTH NIGHTLY, Disp: 90 tablet, Rfl: 1    escitalopram (LEXAPRO) 10 MG tablet, TAKE ONE TABLET BY MOUTH DAILY, Disp: 90 tablet, Rfl: 1    latanoprost (XALATAN) 0.005 % ophthalmic solution, INSTILL ONE DROP INTO THE RIGHT EYE AT BEDTIME., Disp: , Rfl:     amLODIPine (NORVASC) 2.5 MG tablet, Take 1 tablet by mouth daily, Disp: 30 tablet, Rfl: 0    valsartan (DIOVAN) 80 mg tablet, TAKE ONE TABLET BY MOUTH DAILY, Disp: 90 tablet, Rfl: 1    pantoprazole (PROTONIX) 40 MG tablet, Take 1 tablet by mouth every morning (before breakfast), Disp: 90 tablet, Rfl: 1    niacin 500 MG tablet, Take 1 tablet by mouth daily, Disp: 30 tablet, Rfl: 3    aspirin 81 MG EC tablet, Take 81 mg by mouth daily, Disp: , Rfl:     Sleep Medicine 10/20/2022 2022   Sitting and reading 1 2   Watching TV 1 1   Sitting, inactive in a public place (e.g. a theatre or a meeting) 0 0   As a passenger in a car for an hour without a break 0 0   Lying down to rest in the afternoon when circumstances permit 3 3   Sitting and talking to someone 0 0   Sitting quietly after a lunch without alcohol 1 0   In a car, while stopped for a few minutes in traffic 0 0   [place] Sleepiness Score 6 6       Review of Systems:    Constitutional: no chills, no fever   Eyes: no blurred vision   Cardiovascular: no chest pain,   Respiratory: no cough, no shortness of breath   Gastrointestinal:  no nausea,  no vomiting, no diarrhea. Musculoskeletal: no arthralgias, no back pain   Neurological:  no dizziness,  no headache, no memory changes. Endocrine: No chills    Objective:   PHYSICAL EXAM:    /75 (Site: Left Upper Arm, Position: Sitting, Cuff Size: Large Adult)   Pulse 70   Resp 16   Ht 5' 8\" (1.727 m)   Wt 240 lb 1.6 oz (108.9 kg)   SpO2 95%   BMI 36.51 kg/m²     Physical exam:  Gen: No acute distress. BMI of Body mass index is 36.51 kg/m². Neck: Trachea midline. No obvious mass. Neck circumference     Resp: No accessory muscle use. No crackles. No wheezes. No rhonchi. CV: Regular rate. Regular rhythm. No murmur or rub. Skin: Warm and dry. M/S: No cyanosis. No obvious joint deformity. Neuro: Awake. Alert. Moves all four extremities. Psych: Alert and oriented. No anxiety. CPAP Compliance Download -- accessed via CPAP machine, patient brought in. [employer]ics dream station 2. Compliance download report reviewed today by me demonstrated the following:    Dates -last 30 days    Settings -11 cm of water  Days used -30/30  Average usage 9 hours a night  AHI -1.7  Leak -no significance    Assessment:      Noemi Young was seen today for sleep apnea. Diagnoses and all orders for this visit:    Obstructive sleep apnea  -     DME Order for CPAP as OP    Insomnia, unspecified type    Obesity, unspecified classification, unspecified obesity type, unspecified whether serious comorbidity present     Plan:     Adam Alfonso is a 67 y.o. male that  has a past medical history of Depression, Erectile dysfunction, GERD (gastroesophageal reflux disease), Glaucoma, Headache, Hemorrhoids, History of cardiovascular stress test (12/06/2019), Hyperlipidemia, Hypogonadism male, Seizures ([address] and Sleep apnea.  He presents in follow up to Sleep Clinic to review CPAP adherence and efficacy. He demonstrates excellent adherence with resolution of respiratory events (residual AHI 1.7). He notes benefit with CPAP therapy (namely, more consolidated and refreshing sleep and is motivated to continue use. He is using the Dream Station 2 device, but eligible for a new RESMED device in February of 2023. Obstructive Sleep Apnea    -Based on sleep study results, review of device remote download, and discussion with patient, will continue CPAP therapy at settings of 11 cm of water. Will place order for CPAP of 11 cmh2O to be received when he is eligible in Feb.  - Settings are appropriate, with residual AHI 1.7 and maximum and average pressures within prescribed range. - DME is [facility]. - Patient is using a full face mask. No significant leak. -Previously discussed pathophysiology of MERCY and its impact on daily well-being, as well as cardiometabolic and neurocognitive health (particularly in moderate-severe cases). -Patient understands that CPAP should be worn every night for the duration of the night (in order to not miss therapy during early-morning REM period) for maximum benefit. -Reviewed insurance requirements when he gets his new device. 2. Chronic Sleep Maintenance Insomnia     -Patient reports improvement with regular CPAP use. 3. Obesity. Body mass index is 36.51 kg/m². -Previously discussed impact of weight gain on MERCY severity. Patient understands that MERCY severity may improve with weight loss but no guarantee of cure can be made. Return in about 6 months (around 4/20/2023) for CPAP compliance, Follow up for sleep apnea.     Jacques Escalante, APRN-CNP  [address] [facility]  p -[phone] option 2  - [phone]

## 2023-02-16 PROBLEM — F32.0 MAJOR DEPRESSIVE DISORDER, SINGLE EPISODE, MILD (HCC): Status: ACTIVE | Noted: 2023-02-16

## 2023-04-27 ENCOUNTER — TELEPHONE (OUTPATIENT)
Dept: SLEEP CENTER | Age: 73
End: 2023-04-27

## 2023-04-27 DIAGNOSIS — G47.33 OBSTRUCTIVE SLEEP APNEA: Primary | ICD-10-CM

## 2023-04-27 NOTE — TELEPHONE ENCOUNTER
Pt called into the office. At his last appt pt was not eligible for a new pap device until Feb 2023. He called [facility] and was told he needs new orders for pap device and supplies.  NP notified

## 2023-05-12 DIAGNOSIS — K21.9 GASTROESOPHAGEAL REFLUX DISEASE, UNSPECIFIED WHETHER ESOPHAGITIS PRESENT: ICD-10-CM

## 2023-05-12 DIAGNOSIS — G47.33 OBSTRUCTIVE SLEEP APNEA SYNDROME: ICD-10-CM

## 2023-05-12 DIAGNOSIS — F32.0 MAJOR DEPRESSIVE DISORDER, SINGLE EPISODE, MILD (HCC): ICD-10-CM

## 2023-05-12 DIAGNOSIS — E78.5 DYSLIPIDEMIA: ICD-10-CM

## 2023-05-12 DIAGNOSIS — R79.89 ELEVATED LFTS: ICD-10-CM

## 2023-05-12 DIAGNOSIS — E66.01 SEVERE OBESITY (BMI 35.0-39.9) WITH COMORBIDITY (HCC): ICD-10-CM

## 2023-05-12 DIAGNOSIS — E55.9 VITAMIN D DEFICIENCY, UNSPECIFIED: ICD-10-CM

## 2023-05-12 DIAGNOSIS — I10 ESSENTIAL HYPERTENSION: ICD-10-CM

## 2023-05-12 LAB
ALBUMIN SERPL-MCNC: 4.3 G/DL (ref 3.5–5.2)
ALP SERPL-CCNC: 88 U/L (ref 40–129)
ALT SERPL-CCNC: 52 U/L (ref 0–40)
ANION GAP SERPL CALCULATED.3IONS-SCNC: 14 MMOL/L (ref 7–16)
AST SERPL-CCNC: 62 U/L (ref 0–39)
BASOPHILS # BLD: 0.07 E9/L (ref 0–0.2)
BASOPHILS NFR BLD: 0.9 % (ref 0–2)
BILIRUB SERPL-MCNC: 0.6 MG/DL (ref 0–1.2)
BUN SERPL-MCNC: 17 MG/DL (ref 6–23)
CALCIUM SERPL-MCNC: 9.8 MG/DL (ref 8.6–10.2)
CHLORIDE SERPL-SCNC: 104 MMOL/L (ref 98–107)
CO2 SERPL-SCNC: 23 MMOL/L (ref 22–29)
CREAT SERPL-MCNC: 0.9 MG/DL (ref 0.7–1.2)
EOSINOPHIL # BLD: 0.15 E9/L (ref 0.05–0.5)
EOSINOPHIL NFR BLD: 1.9 % (ref 0–6)
ERYTHROCYTE [DISTWIDTH] IN BLOOD BY AUTOMATED COUNT: 12.9 FL (ref 11.5–15)
GLUCOSE SERPL-MCNC: 111 MG/DL (ref 74–99)
HBA1C MFR BLD: 5.3 % (ref 4–5.6)
HCT VFR BLD AUTO: 46.6 % (ref 37–54)
HGB BLD-MCNC: 14.8 G/DL (ref 12.5–16.5)
IMM GRANULOCYTES # BLD: 0.03 E9/L
IMM GRANULOCYTES NFR BLD: 0.4 % (ref 0–5)
LYMPHOCYTES # BLD: 2.32 E9/L (ref 1.5–4)
LYMPHOCYTES NFR BLD: 29.1 % (ref 20–42)
MCH RBC QN AUTO: 28.4 PG (ref 26–35)
MCHC RBC AUTO-ENTMCNC: 31.8 % (ref 32–34.5)
MCV RBC AUTO: 89.3 FL (ref 80–99.9)
MONOCYTES # BLD: 0.54 E9/L (ref 0.1–0.95)
MONOCYTES NFR BLD: 6.8 % (ref 2–12)
NEUTROPHILS # BLD: 4.86 E9/L (ref 1.8–7.3)
NEUTS SEG NFR BLD: 60.9 % (ref 43–80)
PLATELET # BLD AUTO: 232 E9/L (ref 130–450)
PMV BLD AUTO: 9.4 FL (ref 7–12)
POTASSIUM SERPL-SCNC: 4.5 MMOL/L (ref 3.5–5)
PROT SERPL-MCNC: 7.3 G/DL (ref 6.4–8.3)
RBC # BLD AUTO: 5.22 E12/L (ref 3.8–5.8)
SODIUM SERPL-SCNC: 141 MMOL/L (ref 132–146)
TSH SERPL-MCNC: 0.91 UIU/ML (ref 0.27–4.2)
VITAMIN D 25-HYDROXY: 20 NG/ML (ref 30–100)
WBC # BLD: 8 E9/L (ref 4.5–11.5)

## 2023-11-29 ENCOUNTER — HOSPITAL ENCOUNTER (OUTPATIENT)
Age: 73
Discharge: HOME | End: 2023-12-01
Payer: MEDICARE

## 2023-11-29 ENCOUNTER — TRANSCRIBE ORDERS (OUTPATIENT)
Age: 73
End: 2023-11-29

## 2023-11-29 VITALS
WEIGHT: 229 LBS | BODY MASS INDEX: 34.71 KG/M2 | SYSTOLIC BLOOD PRESSURE: 124 MMHG | HEIGHT: 68 IN | DIASTOLIC BLOOD PRESSURE: 68 MMHG

## 2023-11-29 DIAGNOSIS — R68.89 OTHER GENERAL SYMPTOMS AND SIGNS: ICD-10-CM

## 2023-11-29 DIAGNOSIS — R68.89 OTHER GENERAL SYMPTOMS AND SIGNS: Primary | ICD-10-CM

## 2023-11-29 LAB
ECHO AV AREA PEAK VELOCITY: 3.6 CM2
ECHO AV AREA PEAK VELOCITY: 3.8 CM2
ECHO AV AREA PEAK VELOCITY: 3.8 CM2
ECHO AV AREA PEAK VELOCITY: 4 CM2
ECHO AV AREA VTI: 3.8 CM2
ECHO AV AREA/BSA VTI: 1.8 CM2/M2
ECHO AV CUSP MM: 2.3 CM
ECHO AV MEAN GRADIENT: 4 MMHG
ECHO AV MEAN VELOCITY: 0.9 M/S
ECHO AV PEAK GRADIENT: 7 MMHG
ECHO AV PEAK GRADIENT: 7 MMHG
ECHO AV PEAK VELOCITY: 1.2 M/S
ECHO AV PEAK VELOCITY: 1.3 M/S
ECHO AV VTI: 22.9 CM
ECHO BSA: 2.23 M2
ECHO EST RA PRESSURE: 3 MMHG
ECHO LA DIAMETER INDEX: 1.53 CM/M2
ECHO LA DIAMETER: 3.3 CM
ECHO LA VOL A-L A2C: 79 ML (ref 18–58)
ECHO LA VOL A-L A4C: 94 ML (ref 18–58)
ECHO LA VOL MOD A2C: 76 ML (ref 18–58)
ECHO LA VOL MOD A4C: 81 ML (ref 18–58)
ECHO LA VOLUME AREA LENGTH: 88 ML
ECHO LA VOLUME INDEX A-L A2C: 37 ML/M2 (ref 16–34)
ECHO LA VOLUME INDEX A-L A4C: 44 ML/M2 (ref 16–34)
ECHO LA VOLUME INDEX AREA LENGTH: 41 ML/M2 (ref 16–34)
ECHO LA VOLUME INDEX MOD A2C: 35 ML/M2 (ref 16–34)
ECHO LA VOLUME INDEX MOD A4C: 38 ML/M2 (ref 16–34)
ECHO LV FRACTIONAL SHORTENING: 43 % (ref 28–44)
ECHO LV INTERNAL DIMENSION DIASTOLE INDEX: 2.69 CM/M2
ECHO LV INTERNAL DIMENSION DIASTOLIC: 5.8 CM (ref 4.2–5.9)
ECHO LV INTERNAL DIMENSION SYSTOLIC INDEX: 1.53 CM/M2
ECHO LV INTERNAL DIMENSION SYSTOLIC: 3.3 CM
ECHO LV IVSD: 1.2 CM (ref 0.6–1)
ECHO LV IVSS: 1.5 CM
ECHO LV MASS 2D: 297 G (ref 88–224)
ECHO LV MASS INDEX 2D: 137.5 G/M2 (ref 49–115)
ECHO LV POSTERIOR WALL DIASTOLIC: 1.2 CM (ref 0.6–1)
ECHO LV POSTERIOR WALL SYSTOLIC: 1.5 CM
ECHO LV RELATIVE WALL THICKNESS RATIO: 0.41
ECHO LVOT AREA: 3.8 CM2
ECHO LVOT AV VTI INDEX: 0.97
ECHO LVOT DIAM: 2.2 CM
ECHO LVOT MEAN GRADIENT: 3 MMHG
ECHO LVOT PEAK GRADIENT: 6 MMHG
ECHO LVOT PEAK GRADIENT: 6 MMHG
ECHO LVOT PEAK VELOCITY: 1.2 M/S
ECHO LVOT PEAK VELOCITY: 1.3 M/S
ECHO LVOT STROKE VOLUME INDEX: 39.2 ML/M2
ECHO LVOT SV: 84.7 ML
ECHO LVOT VTI: 22.3 CM
ECHO MV "A" WAVE DURATION: 175.3 MSEC
ECHO MV A VELOCITY: 0.77 M/S
ECHO MV AREA PHT: 1.8 CM2
ECHO MV AREA VTI: 3.6 CM2
ECHO MV E DECELERATION TIME (DT): 435.2 MS
ECHO MV E VELOCITY: 0.5 M/S
ECHO MV E/A RATIO: 0.65
ECHO MV LVOT VTI INDEX: 1.05
ECHO MV MAX VELOCITY: 0.9 M/S
ECHO MV MEAN GRADIENT: 1 MMHG
ECHO MV MEAN VELOCITY: 0.5 M/S
ECHO MV PEAK GRADIENT: 3 MMHG
ECHO MV PRESSURE HALF TIME (PHT): 119.3 MS
ECHO MV VTI: 23.4 CM
ECHO PV MAX VELOCITY: 1.3 M/S
ECHO PV MEAN GRADIENT: 3 MMHG
ECHO PV MEAN VELOCITY: 0.9 M/S
ECHO PV PEAK GRADIENT: 6 MMHG
ECHO PV VTI: 23 CM
ECHO PVEIN A DURATION: 147.6 MS
ECHO PVEIN A VELOCITY: 0.7 M/S
ECHO PVEIN PEAK D VELOCITY: 0.3 M/S
ECHO PVEIN PEAK S VELOCITY: 0.7 M/S
ECHO PVEIN S/D RATIO: 2.3
ECHO RIGHT VENTRICULAR SYSTOLIC PRESSURE (RVSP): 19 MMHG
ECHO RV INTERNAL DIMENSION: 3.5 CM
ECHO RVOT PEAK GRADIENT: 2 MMHG
ECHO RVOT PEAK VELOCITY: 0.6 M/S
ECHO TV REGURGITANT MAX VELOCITY: 1.98 M/S
ECHO TV REGURGITANT PEAK GRADIENT: 16 MMHG

## 2023-11-29 PROCEDURE — 93306 TTE W/DOPPLER COMPLETE: CPT

## 2023-12-15 ENCOUNTER — HOSPITAL ENCOUNTER (OUTPATIENT)
Age: 73
End: 2023-12-15
Payer: MEDICARE

## 2023-12-15 DIAGNOSIS — R00.1 BRADYCARDIA: ICD-10-CM

## 2023-12-15 PROCEDURE — 93225 XTRNL ECG REC<48 HRS REC: CPT

## 2024-09-03 PROBLEM — F32.A MILD DEPRESSION: Status: RESOLVED | Noted: 2021-03-30 | Resolved: 2024-09-03

## 2025-03-29 ENCOUNTER — APPOINTMENT (OUTPATIENT)
Dept: CT IMAGING | Age: 75
End: 2025-03-29
Payer: MEDICARE

## 2025-03-29 ENCOUNTER — HOSPITAL ENCOUNTER (EMERGENCY)
Age: 75
Discharge: HOME | End: 2025-03-29
Attending: EMERGENCY MEDICINE
Payer: MEDICARE

## 2025-03-29 ENCOUNTER — APPOINTMENT (OUTPATIENT)
Dept: GENERAL RADIOLOGY | Age: 75
End: 2025-03-29
Payer: MEDICARE

## 2025-03-29 VITALS
TEMPERATURE: 98.5 F | SYSTOLIC BLOOD PRESSURE: 115 MMHG | HEART RATE: 74 BPM | OXYGEN SATURATION: 98 % | DIASTOLIC BLOOD PRESSURE: 80 MMHG | RESPIRATION RATE: 16 BRPM

## 2025-03-29 DIAGNOSIS — R42 DIZZINESS: Primary | ICD-10-CM

## 2025-03-29 DIAGNOSIS — I95.1 ORTHOSTATIC HYPOTENSION: ICD-10-CM

## 2025-03-29 LAB
ALBUMIN SERPL-MCNC: 4.4 G/DL (ref 3.5–5.2)
ALP SERPL-CCNC: 72 U/L (ref 40–129)
ALT SERPL-CCNC: 16 U/L (ref 0–40)
ANION GAP SERPL CALCULATED.3IONS-SCNC: 12 MMOL/L (ref 7–16)
AST SERPL-CCNC: 23 U/L (ref 0–39)
BASOPHILS # BLD: 0.05 K/UL (ref 0–0.2)
BASOPHILS NFR BLD: 1 % (ref 0–2)
BILIRUB SERPL-MCNC: 0.5 MG/DL (ref 0–1.2)
BUN SERPL-MCNC: 22 MG/DL (ref 6–23)
CALCIUM SERPL-MCNC: 9.7 MG/DL (ref 8.6–10.2)
CHLORIDE SERPL-SCNC: 102 MMOL/L (ref 98–107)
CO2 SERPL-SCNC: 25 MMOL/L (ref 22–29)
CREAT SERPL-MCNC: 1.2 MG/DL (ref 0.7–1.2)
EKG ATRIAL RATE: 72 BPM
EKG P AXIS: 17 DEGREES
EKG P-R INTERVAL: 198 MS
EKG Q-T INTERVAL: 386 MS
EKG QRS DURATION: 78 MS
EKG QTC CALCULATION (BAZETT): 422 MS
EKG R AXIS: 11 DEGREES
EKG T AXIS: 37 DEGREES
EKG VENTRICULAR RATE: 72 BPM
EOSINOPHIL # BLD: 0.14 K/UL (ref 0.05–0.5)
EOSINOPHILS RELATIVE PERCENT: 2 % (ref 0–6)
ERYTHROCYTE [DISTWIDTH] IN BLOOD BY AUTOMATED COUNT: 12.8 % (ref 11.5–15)
FLUAV RNA RESP QL NAA+PROBE: NOT DETECTED
FLUBV RNA RESP QL NAA+PROBE: NOT DETECTED
GFR, ESTIMATED: 66 ML/MIN/1.73M2
GLUCOSE SERPL-MCNC: 106 MG/DL (ref 74–99)
HCT VFR BLD AUTO: 43.4 % (ref 37–54)
HGB BLD-MCNC: 15.3 G/DL (ref 12.5–16.5)
IMM GRANULOCYTES # BLD AUTO: 0.03 K/UL (ref 0–0.58)
IMM GRANULOCYTES NFR BLD: 0 % (ref 0–5)
LYMPHOCYTES NFR BLD: 1.7 K/UL (ref 1.5–4)
LYMPHOCYTES RELATIVE PERCENT: 24 % (ref 20–42)
MAGNESIUM SERPL-MCNC: 2.1 MG/DL (ref 1.6–2.6)
MCH RBC QN AUTO: 30.3 PG (ref 26–35)
MCHC RBC AUTO-ENTMCNC: 35.3 G/DL (ref 32–34.5)
MCV RBC AUTO: 85.9 FL (ref 80–99.9)
MONOCYTES NFR BLD: 0.51 K/UL (ref 0.1–0.95)
MONOCYTES NFR BLD: 7 % (ref 2–12)
NEUTROPHILS NFR BLD: 66 % (ref 43–80)
NEUTS SEG NFR BLD: 4.65 K/UL (ref 1.8–7.3)
PLATELET # BLD AUTO: 176 K/UL (ref 130–450)
PMV BLD AUTO: 9.3 FL (ref 7–12)
POTASSIUM SERPL-SCNC: 4.2 MMOL/L (ref 3.5–5)
PROT SERPL-MCNC: 7.7 G/DL (ref 6.4–8.3)
RBC # BLD AUTO: 5.05 M/UL (ref 3.8–5.8)
SARS-COV-2 RNA RESP QL NAA+PROBE: NOT DETECTED
SODIUM SERPL-SCNC: 139 MMOL/L (ref 132–146)
SOURCE: NORMAL
SPECIMEN DESCRIPTION: NORMAL
TROPONIN I SERPL HS-MCNC: 6 NG/L (ref 0–11)
WBC OTHER # BLD: 7.1 K/UL (ref 4.5–11.5)

## 2025-03-29 PROCEDURE — 83735 ASSAY OF MAGNESIUM: CPT

## 2025-03-29 PROCEDURE — 70496 CT ANGIOGRAPHY HEAD: CPT

## 2025-03-29 PROCEDURE — 6360000004 HC RX CONTRAST MEDICATION: Performed by: RADIOLOGY

## 2025-03-29 PROCEDURE — 85025 COMPLETE CBC W/AUTO DIFF WBC: CPT

## 2025-03-29 PROCEDURE — 84484 ASSAY OF TROPONIN QUANT: CPT

## 2025-03-29 PROCEDURE — 71045 X-RAY EXAM CHEST 1 VIEW: CPT

## 2025-03-29 PROCEDURE — 6360000002 HC RX W HCPCS: Performed by: EMERGENCY MEDICINE

## 2025-03-29 PROCEDURE — 96374 THER/PROPH/DIAG INJ IV PUSH: CPT

## 2025-03-29 PROCEDURE — 96361 HYDRATE IV INFUSION ADD-ON: CPT

## 2025-03-29 PROCEDURE — 80053 COMPREHEN METABOLIC PANEL: CPT

## 2025-03-29 PROCEDURE — 93005 ELECTROCARDIOGRAM TRACING: CPT | Performed by: EMERGENCY MEDICINE

## 2025-03-29 PROCEDURE — 87636 SARSCOV2 & INF A&B AMP PRB: CPT

## 2025-03-29 PROCEDURE — 99285 EMERGENCY DEPT VISIT HI MDM: CPT

## 2025-03-29 PROCEDURE — 6370000000 HC RX 637 (ALT 250 FOR IP): Performed by: EMERGENCY MEDICINE

## 2025-03-29 PROCEDURE — 2580000003 HC RX 258: Performed by: EMERGENCY MEDICINE

## 2025-03-29 PROCEDURE — 70498 CT ANGIOGRAPHY NECK: CPT

## 2025-03-29 RX ORDER — IOPAMIDOL 755 MG/ML
100 INJECTION, SOLUTION INTRAVASCULAR
Status: COMPLETED | OUTPATIENT
Start: 2025-03-29 | End: 2025-03-29

## 2025-03-29 RX ORDER — 0.9 % SODIUM CHLORIDE 0.9 %
1000 INTRAVENOUS SOLUTION INTRAVENOUS ONCE
Status: COMPLETED | OUTPATIENT
Start: 2025-03-29 | End: 2025-03-29

## 2025-03-29 RX ORDER — MECLIZINE HYDROCHLORIDE 25 MG/1
25 TABLET ORAL 3 TIMES DAILY PRN
Qty: 15 TABLET | Refills: 0 | Status: SHIPPED | OUTPATIENT
Start: 2025-03-29 | End: 2025-04-08

## 2025-03-29 RX ORDER — MECLIZINE HCL 12.5 MG 12.5 MG/1
25 TABLET ORAL ONCE
Status: COMPLETED | OUTPATIENT
Start: 2025-03-29 | End: 2025-03-29

## 2025-03-29 RX ORDER — ONDANSETRON 2 MG/ML
4 INJECTION INTRAMUSCULAR; INTRAVENOUS ONCE
Status: COMPLETED | OUTPATIENT
Start: 2025-03-29 | End: 2025-03-29

## 2025-03-29 RX ADMIN — IOPAMIDOL 100 ML: 755 INJECTION, SOLUTION INTRAVENOUS at 15:36

## 2025-03-29 RX ADMIN — ONDANSETRON 4 MG: 2 INJECTION, SOLUTION INTRAMUSCULAR; INTRAVENOUS at 15:00

## 2025-03-29 RX ADMIN — MECLIZINE 25 MG: 12.5 TABLET ORAL at 15:00

## 2025-03-29 RX ADMIN — SODIUM CHLORIDE 1000 ML: 0.9 INJECTION, SOLUTION INTRAVENOUS at 15:01

## 2025-03-29 ASSESSMENT — PAIN - FUNCTIONAL ASSESSMENT: PAIN_FUNCTIONAL_ASSESSMENT: 0-10

## 2025-03-29 ASSESSMENT — LIFESTYLE VARIABLES
HOW OFTEN DO YOU HAVE A DRINK CONTAINING ALCOHOL: NEVER
HOW MANY STANDARD DRINKS CONTAINING ALCOHOL DO YOU HAVE ON A TYPICAL DAY: PATIENT DOES NOT DRINK

## 2025-03-29 ASSESSMENT — PAIN DESCRIPTION - ORIENTATION: ORIENTATION: RIGHT;LEFT

## 2025-03-29 ASSESSMENT — PAIN DESCRIPTION - LOCATION: LOCATION: EAR

## 2025-03-29 ASSESSMENT — PAIN DESCRIPTION - ONSET: ONSET: ON-GOING

## 2025-03-29 ASSESSMENT — PAIN DESCRIPTION - DESCRIPTORS: DESCRIPTORS: ACHING;DISCOMFORT;PRESSURE

## 2025-03-29 ASSESSMENT — PAIN SCALES - GENERAL: PAINLEVEL_OUTOF10: 4

## 2025-03-29 ASSESSMENT — PAIN DESCRIPTION - PAIN TYPE: TYPE: ACUTE PAIN

## 2025-03-29 ASSESSMENT — PAIN DESCRIPTION - FREQUENCY: FREQUENCY: CONTINUOUS

## 2025-03-29 NOTE — ED PROVIDER NOTES
[facility] EMERGENCY DEPARTMENT  EMERGENCY DEPARTMENT ENCOUNTER        Pt Name: Felix Medina  MRN: [id number]  Birthdate 1950  Date of evaluation: 3/29/2025  Provider: Kinsey Hernandez DO  PCP: Chivo Fox DO  Note Started: 2:35 PM EDT 3/29/25    CHIEF COMPLAINT       Chief Complaint   Patient presents with    Ear Pain     BILATERAL EAR PRESSURE, HEAD PRESSURE, MOVES THROBBING OCCURS, NO DIZZINESS    Dizziness       HISTORY OF PRESENT ILLNESS: 1 or more Elements   History From: patient    Limitations to history : None    Felix Medina is a 74 y.o. male who presents with dizziness, worse with position changes, feeling head and ear pressure beginning one week ago.  States the dizziness makes him nauseated.  The complaint has been intermittent, moderate in severity.  He denies difficulty breathing or swallowing, fever or chills, sore throat, headache, visual disturbances, memory difficulty or slurred speech.  Patient denies fever/chills, sore throat, cough, congestion, chest pain, shortness of breath, edema, headache, visual disturbances, focal paresthesias, focal weakness, abdominal pain, vomiting, diarrhea, constipation, dysuria, hematuria, trauma, neck or back pain, rash or other complaints.        Nursing Notes were all reviewed and agreed with or any disagreements were addressed in the HPI.    REVIEW OF SYSTEMS :           Positives and Pertinent negatives as per HPI.     SURGICAL HISTORY     Past Surgical History:   Procedure Laterality Date    CARDIAC CATHETERIZATION  2006    KNEE ARTHROPLASTY Right 2020    TOTAL KNEE ARTHROPLASTY Left 2015       CURRENTMEDICATIONS       Previous Medications    AMLODIPINE (NORVASC) 2.5 MG TABLET    TAKE ONE TABLET BY MOUTH DAILY    ASPIRIN 81 MG EC TABLET    Take 1 tablet by mouth daily    FAMOTIDINE (PEPCID) 40 MG TABLET    Take 1 tablet by mouth nightly    LATANOPROST (XALATAN) 0.005 % OPHTHALMIC SOLUTION    INSTILL ONE DROP INTO THE RIGHT  Motor Drift 0 - no drift; leg holds 30 degree position for full 5 seconds   7: Test Limb Ataxia   (FNF/Heel-Shin) 0 - absent   8: Test Sensation 0 - normal; no sensory loss   9: Test Language/Aphasia 0 - no aphasia, normal   10: Test Dysarthria 0 - normal   11: Test Extinction/Inattention 0 - no abnormality   Total 0           DIAGNOSTIC RESULTS   LABS:  Results for orders placed or performed during the hospital encounter of 03/29/25   COVID-19 & Influenza Combo    Specimen: Nasopharyngeal Swab   Result Value Ref Range    Specimen Description .NASOPHARYNGEAL SWAB     Source .NASOPHARYNGEAL SWAB     SARS-CoV-2 RNA, RT PCR Not Detected Not Detected    Influenza A Not Detected Not Detected    Influenza B Not Detected Not Detected   CBC with Auto Differential   Result Value Ref Range    WBC 7.1 4.5 - 11.5 k/uL    RBC 5.05 3.80 - 5.80 m/uL    Hemoglobin 15.3 12.5 - 16.5 g/dL    Hematocrit 43.4 37.0 - 54.0 %    MCV 85.9 80.0 - 99.9 fL    MCH 30.3 26.0 - 35.0 pg    MCHC 35.3 (H) 32.0 - 34.5 g/dL    RDW 12.8 11.5 - 15.0 %    Platelets 176 130 - 450 k/uL    MPV 9.3 7.0 - 12.0 fL    Neutrophils % 66 43.0 - 80.0 %    Lymphocytes % 24 20.0 - 42.0 %    Monocytes % 7 2.0 - 12.0 %    Eosinophils % 2 0 - 6 %    Basophils % 1 0.0 - 2.0 %    Immature Granulocytes % 0 0.0 - 5.0 %    Neutrophils Absolute 4.65 1.80 - 7.30 k/uL    Lymphocytes Absolute 1.70 1.50 - 4.00 k/uL    Monocytes Absolute 0.51 0.10 - 0.95 k/uL    Eosinophils Absolute 0.14 0.05 - 0.50 k/uL    Basophils Absolute 0.05 0.00 - 0.20 k/uL    Immature Granulocytes Absolute 0.03 0.00 - 0.58 k/uL   Comprehensive Metabolic Panel   Result Value Ref Range    Sodium 139 132 - 146 mmol/L    Potassium 4.2 3.5 - 5.0 mmol/L    Chloride 102 98 - 107 mmol/L    CO2 25 22 - 29 mmol/L    Anion Gap 12 7 - 16 mmol/L    Glucose 106 (H) 74 - 99 mg/dL    BUN 22 6 - 23 mg/dL    Creatinine 1.2 0.70 - 1.20 mg/dL    Est, Glom Filt Rate 66 >60 mL/min/1.73m2    Calcium 9.7 8.6 - 10.2 mg/dL    Total

## 2025-03-29 NOTE — ED NOTES
During ortho BP states no dizziness when lying flat, states dizziness from lying to sitting position, and dizziness from sitting to standing. Dr. Hernandez notified.

## 2025-03-31 ENCOUNTER — TELEPHONE (OUTPATIENT)
Dept: ADMINISTRATIVE | Age: 75
End: 2025-03-31

## 2025-03-31 LAB
EKG ATRIAL RATE: 72 BPM
EKG P AXIS: 17 DEGREES
EKG P-R INTERVAL: 198 MS
EKG Q-T INTERVAL: 386 MS
EKG QRS DURATION: 78 MS
EKG QTC CALCULATION (BAZETT): 422 MS
EKG R AXIS: 11 DEGREES
EKG T AXIS: 37 DEGREES
EKG VENTRICULAR RATE: 72 BPM

## 2025-03-31 PROCEDURE — 93010 ELECTROCARDIOGRAM REPORT: CPT | Performed by: INTERNAL MEDICINE

## 2025-03-31 NOTE — TELEPHONE ENCOUNTER
Patient was in the ED for extreme vertigo and Dr. Angel was the on call physician. They would like to get in as soon as possible.

## 2025-03-31 NOTE — TELEPHONE ENCOUNTER
Spoke with pt's daughter and got pt scheduled tomorrow in [place], pt needed scheduled while his son is in town to drive him

## 2025-04-01 ENCOUNTER — OFFICE VISIT (OUTPATIENT)
Dept: ENT CLINIC | Age: 75
End: 2025-04-01
Payer: MEDICARE

## 2025-04-01 ENCOUNTER — PROCEDURE VISIT (OUTPATIENT)
Dept: AUDIOLOGY | Age: 75
End: 2025-04-01
Payer: MEDICARE

## 2025-04-01 VITALS
DIASTOLIC BLOOD PRESSURE: 78 MMHG | TEMPERATURE: 97.3 F | HEART RATE: 82 BPM | HEIGHT: 68 IN | BODY MASS INDEX: 32.74 KG/M2 | OXYGEN SATURATION: 98 % | WEIGHT: 216 LBS | SYSTOLIC BLOOD PRESSURE: 128 MMHG

## 2025-04-01 DIAGNOSIS — R55 NEAR SYNCOPE: ICD-10-CM

## 2025-04-01 DIAGNOSIS — I95.1 ORTHOSTATIC HYPOTENSION: ICD-10-CM

## 2025-04-01 DIAGNOSIS — R42 LIGHTHEADEDNESS: ICD-10-CM

## 2025-04-01 DIAGNOSIS — H69.93 DYSFUNCTION OF BOTH EUSTACHIAN TUBES: ICD-10-CM

## 2025-04-01 DIAGNOSIS — R42 DIZZINESS: Primary | ICD-10-CM

## 2025-04-01 DIAGNOSIS — R42 DISEQUILIBRIUM: Primary | ICD-10-CM

## 2025-04-01 PROCEDURE — 92567 TYMPANOMETRY: CPT | Performed by: AUDIOLOGIST

## 2025-04-01 PROCEDURE — 3078F DIAST BP <80 MM HG: CPT | Performed by: NURSE PRACTITIONER

## 2025-04-01 PROCEDURE — G8427 DOCREV CUR MEDS BY ELIG CLIN: HCPCS | Performed by: NURSE PRACTITIONER

## 2025-04-01 PROCEDURE — 3017F COLORECTAL CA SCREEN DOC REV: CPT | Performed by: NURSE PRACTITIONER

## 2025-04-01 PROCEDURE — 1036F TOBACCO NON-USER: CPT | Performed by: NURSE PRACTITIONER

## 2025-04-01 PROCEDURE — 3074F SYST BP LT 130 MM HG: CPT | Performed by: NURSE PRACTITIONER

## 2025-04-01 PROCEDURE — 1160F RVW MEDS BY RX/DR IN RCRD: CPT | Performed by: NURSE PRACTITIONER

## 2025-04-01 PROCEDURE — 1123F ACP DISCUSS/DSCN MKR DOCD: CPT | Performed by: NURSE PRACTITIONER

## 2025-04-01 PROCEDURE — 99204 OFFICE O/P NEW MOD 45 MIN: CPT | Performed by: NURSE PRACTITIONER

## 2025-04-01 PROCEDURE — G8417 CALC BMI ABV UP PARAM F/U: HCPCS | Performed by: NURSE PRACTITIONER

## 2025-04-01 PROCEDURE — 1159F MED LIST DOCD IN RCRD: CPT | Performed by: NURSE PRACTITIONER

## 2025-04-01 ASSESSMENT — ENCOUNTER SYMPTOMS
RHINORRHEA: 0
SINUS PAIN: 0
SINUS PRESSURE: 0

## 2025-04-01 NOTE — PROGRESS NOTES
This patient was referred for tympanometric testing by DAYDAY Rutherford due to dizziness.     Tympanometry revealed shallow tympanograms, bilaterally.    The results were reviewed with the patient and ordering provider.     Recommendations for follow up will be made pending ordering provider consult.    Electronically signed by Santa Reilly on 4/1/2025 at 1:28 PM

## 2025-04-01 NOTE — PROGRESS NOTES
AUDIOLOGY SERVICES   VESTIBULAR TESTING INSTRUCTIONS    Appointment on Wednesday May 14th at 10:30  ARRIVE at 10:00 to register in the Main [facility], [address] [place], [place] [address] [address]: [phone]    Your doctor has ordered a Vestibular Test to evaluate your balance system (Videonystagmography/ VNG). Several separate parts may be performed, and all are simple and not unpleasant. The balance system of the ear and the brain are evaluated by recording eye movement in response to changes in head position, viewing moving objects, motion of the body, and thermal stimuli (warm and cold air) applied to the ear canal. Vestibular testing is about 1-1 ½ hours in duration.   PATIENT RESPONSIBILITY:   Try to get a full night's sleep before the test.   Remove contact lenses before the exam. Inform the audiologist of any vision problems or lens implants.   Wear comfortable clothing and flat or low-heeled shoes.   Eat a light meal 2 to 4 hours before the test.   DO NOT wear eye makeup, mascara, or fake eye lashes. (If you have tattooed eye makeup, please let the office know)   Avoid beverages containing caffeine (coffee, tea or soft drinks) the day of the test.   Please bring a list of your current medications.   Please continue to take any medications which are necessary to your long-term and immediate health. Any heart, blood pressure, blood thinning, diabetes or epilepsy medications should be taken as usual.   Because this test may cause you to feel dizzy, we recommend that you have someone drive you to your appointment.   FOR 48 HOURS PRIOR TO THE TEST, DO NOT TAKE THE FOLLOWING MEDICATIONS   This list is not conclusive; any drug that fits into the categories listed below may influence the body to give inaccurate test results.   NEVER DISCONTINUE PRESCRIBED MEDICATIONS WITHOUT CHECKING WITH THE PRESCRIBING PHYSICIAN FIRST. THIS IS YOUR RESPONSIBILITY AS A PATIENT.   If you are unable to stop any

## 2025-04-01 NOTE — PROGRESS NOTES
DEPARTMENT OF OTOLARYNGOLOGY  NAYA ChanceO., Ms. Ed.  NAYA WillsonO.  Andi Fonseca, MSN, FNP-C        Patient Name:  Felix Medina  :  1950     CHIEF C/O:    Chief Complaint   Patient presents with    New Patient     NEW PATIENT - ED for extreme vertigo x         HISTORY OBTAINED FROM:  patient    HISTORY OF PRESENT ILLNESS:       Felix is a 74 y.o. year old male, here today for:         History of Present Illness  The patient presents for evaluation of dizziness.    He experienced a sudden onset of vertigo last week, which necessitated an emergency room visit. The ER physician prescribed meclizine and Augmentin, which provided some relief. However, he is uncertain if these medications are causing his sleep disturbances and nightmares. His symptoms have improved but persist, accompanied by nausea and a sensation of lightheadedness, as though he might faint. He reports no headaches. His symptoms are exacerbated by standing or walking, leading to a feeling of imbalance. He also experiences these symptoms when looking up or down, but not when turning his head left or right. He has been diagnosed with hearing loss and uses hearing aids. His last audiological evaluation was conducted 2 months ago in [place]. He reports no tinnitus. He reports no leg or ankle swelling. He has a history of ear infections and has undergone ear surgery.    The ER physician suspected sinusitis as a potential underlying cause. He experiences seasonal sinus drainage but does not take any medication for it. He admits to inadequate fluid intake and has not started any new medications recently.    He has been experiencing nightmares and sleep disturbances, which he attributes to his current medication regimen. He has previously used ZzzQuil and medical marijuana for sleep, which were effective. He quit coffee 38 years ago but consumes 2 to 3 cups of tea daily.    Supplemental Information  He had back problems

## 2025-04-08 PROBLEM — R42 VERTIGO: Status: ACTIVE | Noted: 2025-04-08

## 2025-04-08 NOTE — PROGRESS NOTES
Physical Therapy Daily Treatment Note    Date: 2025  Patient Name: Felix Medina  : 1950   MRN: [id number]  DOInjury:  ~3/25/2025  DOSx: -  Referring Provider: Chivo Fox DO  [address] [place] [address]     Medical Diagnosis:    Diagnosis Orders   1. Vertigo          Outcome Measure:   Dizziness Handicap Inventory (DHI) 22 Mild Handicap     S: See eval  O:  Time 0568-1994     Visit 1     Pain 5/10 Low back    ROM      Manual maneuvers      Mckenna Hallpike R                      L  Log roll R               L negative  negative  negative  negative     Epley (CRM) canalith repositioning maneuver         Exercise      Nustep        Sit/Stands 10/30 sec Use of bilateral upper extremities     TUG test 14 sec     Gait training 2 x 100 feet, 1 x 120 feet    slightly unsteady, decreased godwin      NMR Balance activities to aid in safe community and home ambulation    VOR                        vertical                              horizontal  x 10   x 10     Saccades                vertical                              horizontal  x 10   x 10     Smooth Pursuit  x 10 sec     ABCD      Ball pass eyes ball               Eyes outside      Diagonal ball chops            Marching Gait      Sidestepping      Gait with head turns f/e                                  Rot                                   2 x 50 feet  2 x 50 feet    minimal dizziness, mild unsteady gait  moderate dizziness, loss of balance x 1       Ball press up squat eyes out   eyes on ball      90* turn step up                  A:  Tolerated well.  Above added to written HEP.  P: Continue with rehab plan  Ani Martin PT    Treatment Charges: Mins Units   Initial Evaluation 20 1   Re-Evaluation     Ther Exercise         TE     Manual Therapy     MT     Ther Activities        TA     Gait Training          GT     Neuro Re-education NR 25 2   Modalities     Non-Billable Service Time 5    Other     Total Time/Units 50 3     
diagnosis/prognosis and consents to treatment, plan and goals: [x] Yes    [] No     I CERTIFY THAT THE ABOVE EVALUATION AND PLAN OF CARE FOR PHYSICAL THERAPY SERVICES ARE APPROPRIATE AND MEDICALLY NECESSARY.    Thank you for the opportunity to work with your patient.  If you have questions or comments, please contact me at [phone]; fax: [phone].    Electronically signed by: Ani Martin PT           Please sign Physician's Certification and return to:  [facility] PHYSICAL THERAPY  [facility]  [facility] [address]  Dept: [phone]  Dept Fax: [phone]  Loc: [phone]    Physician's Certification / Comments     Frequency/Duration 1-2 times per week for 8-12 weeks.   Certification period from 4/9/2025 to 7/9/2025.    I have reviewed the Plan of Care established for skilled therapy services and certify that the services are required and that they will be provided while the patient is under my care.    Physician's Comments/Revisions:               Physician's Printed Name:                                           Physician's Signature:                                                               Date:         
Never

## 2025-04-09 ENCOUNTER — EVALUATION (OUTPATIENT)
Dept: PHYSICAL THERAPY | Age: 75
End: 2025-04-09
Payer: MEDICARE

## 2025-04-09 DIAGNOSIS — R42 VERTIGO: Primary | ICD-10-CM

## 2025-04-09 PROCEDURE — 97161 PT EVAL LOW COMPLEX 20 MIN: CPT | Performed by: PHYSICAL THERAPIST

## 2025-04-09 PROCEDURE — 97112 NEUROMUSCULAR REEDUCATION: CPT | Performed by: PHYSICAL THERAPIST

## 2025-04-16 ENCOUNTER — HOSPITAL ENCOUNTER (OUTPATIENT)
Dept: MRI IMAGING | Age: 75
Discharge: HOME | End: 2025-04-18
Attending: FAMILY MEDICINE
Payer: MEDICARE

## 2025-04-16 DIAGNOSIS — R42 DISEQUILIBRIUM: ICD-10-CM

## 2025-04-16 PROCEDURE — 70551 MRI BRAIN STEM W/O DYE: CPT

## 2025-04-21 ENCOUNTER — TREATMENT (OUTPATIENT)
Dept: PHYSICAL THERAPY | Age: 75
End: 2025-04-21
Payer: MEDICARE

## 2025-04-21 DIAGNOSIS — R42 VERTIGO: Primary | ICD-10-CM

## 2025-04-21 PROCEDURE — 97112 NEUROMUSCULAR REEDUCATION: CPT

## 2025-04-21 NOTE — PROGRESS NOTES
Physical Therapy Daily Treatment Note    Date: 2025  Patient Name: Felix Medina  : 1950   MRN: [id number]  DOInjury:  ~3/25/2025  DOSx: -  Referring Provider: [place] [address]     Medical Diagnosis:   1. Vertigo        Outcome Measure:   Dizziness Handicap Inventory (DHI) 22 Mild Handicap     S: having 4 injections put in back.  From 3 weeks ago 90% better,    O:Access Code: [email or web address]  URL: [email or web address]  Date: 2025  Prepared by: Osiris Almazan    Exercises  - Seated Cervical Rotation AROM  - 2 x daily - 7 x weekly - 10 reps - 10 sec hold  - Seated Cervical Flexion AROM  - 2 x daily - 7 x weekly - 10 reps - 10 sec hold  - Seated Cervical Sidebending AROM  - 2 x daily - 7 x weekly - 10 reps - 10 sec hold  - Single Leg Stance  - 1 x daily - 7 x weekly - 3 reps - 30 sec hold  - Tandem Stance  - 1 x daily - 7 x weekly  - Supine Lower Trunk Rotation  - 1 x daily - 7 x weekly - 2 sets - 20 reps  - Seated Lumbar Flexion Stretch  - 3-4 x daily - 7 x weekly - 10 reps  Time 7142-4053     Visit 2     Pain 5/10 Low back    ROM      Manual maneuvers      Mckenna Hallpike R                      L  Log roll R               L negative  negative  negative  negative     Epley (CRM) canalith repositioning maneuver         Exercise      Nustep        Sit/Stands 10/30 sec Use of bilateral upper extremities     TUG test 14 sec     Gait training 2 x 100 feet, 1 x 120 feet    slightly unsteady, decreased godwin      NMR Balance activities to aid in safe community and home ambulation    VOR                        vertical                              horizontal     Saccades                vertical                              horizontal     Smooth Pursuit     ABCD      Ball pass eyes ball               Eyes outside      Diagonal ball chops            Marching Gait      Sidestepping      Gait with head turns f/e                                  LF

## 2025-04-22 PROBLEM — M54.50 LUMBAR BACK PAIN: Status: ACTIVE | Noted: 2025-04-22

## 2025-05-02 ENCOUNTER — TREATMENT (OUTPATIENT)
Dept: PHYSICAL THERAPY | Age: 75
End: 2025-05-02
Payer: MEDICARE

## 2025-05-02 DIAGNOSIS — R42 VERTIGO: Primary | ICD-10-CM

## 2025-05-02 PROCEDURE — 97112 NEUROMUSCULAR REEDUCATION: CPT

## 2025-05-02 NOTE — PROGRESS NOTES
Physical Therapy Daily Treatment Note    Date: 2025  Patient Name: Felix Medina  : 1950   MRN: [id number]  DOInjury:  ~3/25/2025  DOSx: -  Referring Provider: [place] [address]     Medical Diagnosis:   1. Vertigo        Outcome Measure:   Dizziness Handicap Inventory (DHI) 22 Mild Handicap     S: having 4 injections put in back.  From 3 weeks ago 90% better,  Having a test May 14th concerning Dizzines  O:Access Code: [email or web address]  URL: [email or web address]  Date: 2025  Prepared by: Osiris Almazan    Exercises  - Seated Cervical Rotation AROM  - 2 x daily - 7 x weekly - 10 reps - 10 sec hold  - Seated Cervical Flexion AROM  - 2 x daily - 7 x weekly - 10 reps - 10 sec hold  - Seated Cervical Sidebending AROM  - 2 x daily - 7 x weekly - 10 reps - 10 sec hold  - Single Leg Stance  - 1 x daily - 7 x weekly - 3 reps - 30 sec hold  - Tandem Stance  - 1 x daily - 7 x weekly  - Supine Lower Trunk Rotation  - 1 x daily - 7 x weekly - 2 sets - 20 reps  - Seated Lumbar Flexion Stretch  - 3-4 x daily - 7 x weekly - 10 reps  Time 9203-7992     Visit 3     Pain 5/10 Low back    ROM      Manual maneuvers      Mode Hallpike R                      L  Log roll R               L negative  negative  negative  negative     Epley (CRM) canalith repositioning maneuver         Exercise      Nustep        Sit/Stands 10/30 sec Use of bilateral upper extremities     TUG test 14 sec     Gait training 2 x 100 feet, 1 x 120 feet    slightly unsteady, decreased godwin      NMR Balance activities to aid in safe community and home ambulation    VOR                        vertical                              horizontal     Saccades                vertical                              horizontal     Smooth Pursuit     ABCD      Ball pass eyes ball               Eyes outside      Diagonal ball chops            Marching Gait      Sidestepping      Gait with head turns f/e

## 2025-05-09 ENCOUNTER — TREATMENT (OUTPATIENT)
Dept: PHYSICAL THERAPY | Age: 75
End: 2025-05-09

## 2025-05-09 DIAGNOSIS — R42 VERTIGO: Primary | ICD-10-CM

## 2025-05-09 NOTE — PROGRESS NOTES
Physical Therapy Daily Treatment Note    Date: 2025  Patient Name: Felix Medina  : 1950   MRN: [id number]  DOInjury:  ~3/25/2025  DOSx: -  Referring Provider: [place] [address]     Medical Diagnosis:   1. Vertigo        Outcome Measure:   Dizziness Handicap Inventory (DHI) 22 Mild Handicap     S: still having issues went golfing and when looking down it looks like the ball is moving.   From 3 weeks ago 90% better,  Having a test May 14th concerning Dizzines  O:Access Code: [email or web address]  URL: [email or web address]  Date: 2025  Prepared by: Osiris Almazan    Exercises  - Seated Cervical Rotation AROM  - 2 x daily - 7 x weekly - 10 reps - 10 sec hold  - Seated Cervical Flexion AROM  - 2 x daily - 7 x weekly - 10 reps - 10 sec hold  - Seated Cervical Sidebending AROM  - 2 x daily - 7 x weekly - 10 reps - 10 sec hold  - Single Leg Stance  - 1 x daily - 7 x weekly - 3 reps - 30 sec hold  - Tandem Stance  - 1 x daily - 7 x weekly  - Supine Lower Trunk Rotation  - 1 x daily - 7 x weekly - 2 sets - 20 reps  - Seated Lumbar Flexion Stretch  - 3-4 x daily - 7 x weekly - 10 reps  Time 7037-7831     Visit 4     Pain 5/10 Low back    ROM      Manual maneuvers      Star Hallpike R                      L  Log roll R               L negative  negative  negative  negative     Epley (CRM) canalith repositioning maneuver         Exercise      Nustep        Sit/Stands 10/30 sec Use of bilateral upper extremities     TUG test 14 sec     Gait training 2 x 100 feet, 1 x 120 feet    slightly unsteady, decreased godwin      NMR Balance activities to aid in safe community and home ambulation    VOR                        vertical                              horizontal     Saccades                vertical                              horizontal     Smooth Pursuit     ABCD      Ball pass eyes ball               Eyes outside      Diagonal ball chops 9# 10x Golf swing to the

## 2025-05-14 ENCOUNTER — HOSPITAL ENCOUNTER (OUTPATIENT)
Dept: AUDIOLOGY | Age: 75
Discharge: HOME | End: 2025-05-14

## 2025-05-14 PROCEDURE — 9990000010 HC NO CHARGE VISIT: Performed by: AUDIOLOGIST

## 2025-05-14 NOTE — PROGRESS NOTES
Patient came in for vng testing. VNG testing was attempted however discontinued as the patient had a family emergency. He will call back to reschedule.  Electronically signed by Santa Reilly on 5/14/2025 at 11:35 AM

## 2025-05-19 ENCOUNTER — TELEPHONE (OUTPATIENT)
Dept: ADMINISTRATIVE | Age: 75
End: 2025-05-19

## 2025-05-19 NOTE — TELEPHONE ENCOUNTER
Appt cancelled for 5/22 with Andi Fonseca due to pt's wife passing away.  Leola will call back to reschedule.

## 2025-06-03 ENCOUNTER — TELEPHONE (OUTPATIENT)
Dept: AUDIOLOGY | Age: 75
End: 2025-06-03

## 2025-06-03 NOTE — TELEPHONE ENCOUNTER
Called patient to reschedule VNG and left a voicemail for the patient to call back for scheduling.    Electronically signed by Santa Reilly on 6/3/25 at 8:08 AM EDT

## 2025-06-06 ENCOUNTER — TELEPHONE (OUTPATIENT)
Dept: AUDIOLOGY | Age: 75
End: 2025-06-06

## 2025-06-06 NOTE — TELEPHONE ENCOUNTER
Called to offer sooner VNG appointment, however the patient will be out of town for the sooner appointment time.   Electronically signed by Santa Reilly on 6/6/2025 at 10:22 AM

## 2025-06-18 ENCOUNTER — FOLLOWUP TELEPHONE ENCOUNTER (OUTPATIENT)
Dept: AUDIOLOGY | Age: 75
End: 2025-06-18

## 2025-06-18 ENCOUNTER — HOSPITAL ENCOUNTER (OUTPATIENT)
Dept: AUDIOLOGY | Age: 75
Discharge: HOME | End: 2025-06-18
Payer: MEDICARE

## 2025-06-18 PROCEDURE — 92537 CALORIC VSTBLR TEST W/REC: CPT | Performed by: AUDIOLOGIST

## 2025-06-18 PROCEDURE — 92540 BASIC VESTIBULAR EVALUATION: CPT | Performed by: AUDIOLOGIST

## 2025-06-18 NOTE — TELEPHONE ENCOUNTER
VNG completed today. Please call the patient to schedule his VNG follow-up with Andi Fonseca.  Thank you!  Electronically signed by Santa Reilly on 6/18/2025 at 2:36 PM

## 2025-06-18 NOTE — PROGRESS NOTES
VNG EVALUATION    REASON FOR REFERRAL:  This patient was referred for VNG testing by LUPILLO Rutherford CNP due to dizziness.  He reported dizziness that has been present for a couple of months.  He reported that it is not constant.  It happens mostly when he is walking or getting up.  He followed all pre-testing instructions.       RESULTS:  Bithermal caloric irrigations revealed appropriate beating nystagmus with no unilateral weakness.  Directional preponderance and fixation suppression were within normal limits.  No irregular eye movements were recorded during saccades or optokinetic testing.  Pendular tracking revealed saccadic movements throughout testing. No significant nystagmus was recorded during gaze testing eyes covered or uncovered. Some nystagmus is noted during positional testing eyes covered and uncovered.         IMPRESSION:  Caloric test results were within normal limits bilaterally.  Oculomotor and positional test results cannot rule out central or peripheral involvement.      If I can be of further assistance or provide additional information, please do not hesitate to contact this office.    Thank you for the referral.      __________________________________  Electronically signed by Santa Reilly on 6/18/2025 at 2:35 PM

## 2025-06-19 ENCOUNTER — TELEPHONE (OUTPATIENT)
Dept: ENT CLINIC | Age: 75
End: 2025-06-19

## 2025-06-19 NOTE — TELEPHONE ENCOUNTER
Patient calling to schedule VNG results. No soon appointments to offer. PLease contact patient to schedule.

## 2025-06-19 NOTE — TELEPHONE ENCOUNTER
Patient calling to schedule VNG results. No soon appointments to offer. PLease contact patient to schedule.    Justine Villalpando

## 2025-07-17 ENCOUNTER — OFFICE VISIT (OUTPATIENT)
Dept: ENT CLINIC | Age: 75
End: 2025-07-17
Payer: MEDICARE

## 2025-07-17 VITALS
RESPIRATION RATE: 16 BRPM | HEART RATE: 67 BPM | TEMPERATURE: 97.7 F | BODY MASS INDEX: 33.34 KG/M2 | OXYGEN SATURATION: 96 % | SYSTOLIC BLOOD PRESSURE: 145 MMHG | DIASTOLIC BLOOD PRESSURE: 82 MMHG | WEIGHT: 220 LBS | HEIGHT: 68 IN

## 2025-07-17 DIAGNOSIS — R42 DISEQUILIBRIUM: Primary | ICD-10-CM

## 2025-07-17 DIAGNOSIS — R42 LIGHTHEADEDNESS: ICD-10-CM

## 2025-07-17 PROCEDURE — G8427 DOCREV CUR MEDS BY ELIG CLIN: HCPCS | Performed by: NURSE PRACTITIONER

## 2025-07-17 PROCEDURE — 1160F RVW MEDS BY RX/DR IN RCRD: CPT | Performed by: NURSE PRACTITIONER

## 2025-07-17 PROCEDURE — 3077F SYST BP >= 140 MM HG: CPT | Performed by: NURSE PRACTITIONER

## 2025-07-17 PROCEDURE — G8417 CALC BMI ABV UP PARAM F/U: HCPCS | Performed by: NURSE PRACTITIONER

## 2025-07-17 PROCEDURE — 99213 OFFICE O/P EST LOW 20 MIN: CPT | Performed by: NURSE PRACTITIONER

## 2025-07-17 PROCEDURE — 1159F MED LIST DOCD IN RCRD: CPT | Performed by: NURSE PRACTITIONER

## 2025-07-17 PROCEDURE — 1123F ACP DISCUSS/DSCN MKR DOCD: CPT | Performed by: NURSE PRACTITIONER

## 2025-07-17 PROCEDURE — 3017F COLORECTAL CA SCREEN DOC REV: CPT | Performed by: NURSE PRACTITIONER

## 2025-07-17 PROCEDURE — 1036F TOBACCO NON-USER: CPT | Performed by: NURSE PRACTITIONER

## 2025-07-17 PROCEDURE — 3079F DIAST BP 80-89 MM HG: CPT | Performed by: NURSE PRACTITIONER

## 2025-07-17 ASSESSMENT — ENCOUNTER SYMPTOMS
SINUS PRESSURE: 0
RHINORRHEA: 0
SINUS PAIN: 0

## 2025-07-17 NOTE — PROGRESS NOTES
DEPARTMENT OF OTOLARYNGOLOGY  NAYA ChanceO., Ms. Ed.  NAYA WillsonO.  Andi Fonseca, MSN, FNP-C        Patient Name:  Felix Medina  :  1950     CHIEF C/O:    Chief Complaint   Patient presents with    Follow-up     VNG results        HISTORY OBTAINED FROM:  patient    HISTORY OF PRESENT ILLNESS:       Felix is a 75 y.o. year old male, here today for follow up of:       History of Present Illness  The patient presents for evaluation of vertigo.    He reports that his last episode of vertigo occurred approximately 6 weeks ago. Currently, he is not experiencing any dizziness or changes in his hearing since the last visit. He also does not have any symptoms of congestion, runny nose, postnasal drainage, sinus pain, or pressure. He uses a hearing aid.    Supplemental Information  He has knee problems and received an injection 2 days ago. They cauterized the nerves in his knee.        Past Medical History:   Diagnosis Date    Depression     Erectile dysfunction     GERD (gastroesophageal reflux disease)     Glaucoma     Headache     Hemorrhoids     History of cardiovascular stress test 2019    Stress ECHO    Hyperlipidemia     Hypogonadism male     Seizures (HCC)     Sleep apnea      Past Surgical History:   Procedure Laterality Date    CARDIAC CATHETERIZATION  2006    KNEE ARTHROPLASTY Right 2020    TOTAL KNEE ARTHROPLASTY Left 2015       Current Outpatient Medications:     simvastatin (ZOCOR) 20 MG tablet, TAKE 1 TABLET BY MOUTH NIGHTLY, Disp: 90 tablet, Rfl: 0    valsartan (DIOVAN) 80 MG tablet, Take 1 tablet by mouth daily, Disp: 90 tablet, Rfl: 1    fluticasone (FLONASE) 50 MCG/ACT nasal spray, 2 sprays by Each Nostril route daily, Disp: 16 g, Rfl: 5    famotidine (PEPCID) 40 MG tablet, Take 1 tablet by mouth nightly, Disp: 30 tablet, Rfl: 3    timolol (TIMOPTIC-XE) 0.5 % ophthalmic gel-forming, INSTILL ONE DROP INTO THE RIGHT EYE EVERY MORNING, Disp: , Rfl:     traZODone